# Patient Record
Sex: FEMALE | Race: WHITE | Employment: FULL TIME | URBAN - METROPOLITAN AREA
[De-identification: names, ages, dates, MRNs, and addresses within clinical notes are randomized per-mention and may not be internally consistent; named-entity substitution may affect disease eponyms.]

---

## 2017-01-10 ENCOUNTER — TELEPHONE (OUTPATIENT)
Dept: INTERNAL MEDICINE CLINIC | Age: 42
End: 2017-01-10

## 2017-02-13 ENCOUNTER — HOSPITAL ENCOUNTER (OUTPATIENT)
Dept: OTHER | Age: 42
Discharge: HOME | End: 2017-02-13
Attending: INTERNAL MEDICINE | Admitting: INTERNAL MEDICINE

## 2017-02-13 ENCOUNTER — OFFICE VISIT (OUTPATIENT)
Dept: INTERNAL MEDICINE CLINIC | Age: 42
End: 2017-02-13

## 2017-02-13 VITALS
SYSTOLIC BLOOD PRESSURE: 92 MMHG | DIASTOLIC BLOOD PRESSURE: 62 MMHG | BODY MASS INDEX: 23.93 KG/M2 | HEART RATE: 92 BPM | WEIGHT: 140.2 LBS | HEIGHT: 64 IN | TEMPERATURE: 98.9 F

## 2017-02-13 DIAGNOSIS — R10.13 EPIGASTRIC PAIN: Primary | ICD-10-CM

## 2017-02-13 DIAGNOSIS — N93.9 VAGINAL SPOTTING: ICD-10-CM

## 2017-02-13 LAB
A/G RATIO: 1.4 (ref 1.1–2.2)
ALBUMIN SERPL-MCNC: 4.6 G/DL (ref 3.4–5)
ALP BLD-CCNC: 45 U/L (ref 40–129)
ALT SERPL-CCNC: 18 U/L (ref 10–40)
ANION GAP SERPL CALCULATED.3IONS-SCNC: 14 MMOL/L (ref 3–16)
AST SERPL-CCNC: 18 U/L (ref 15–37)
BILIRUB SERPL-MCNC: 0.3 MG/DL (ref 0–1)
BILIRUBIN, POC: NORMAL
BLOOD URINE, POC: NORMAL
BUN BLDV-MCNC: 8 MG/DL (ref 7–20)
CALCIUM SERPL-MCNC: 10.5 MG/DL (ref 8.3–10.6)
CHLORIDE BLD-SCNC: 101 MMOL/L (ref 99–110)
CLARITY, POC: NORMAL
CO2: 25 MMOL/L (ref 21–32)
COLOR, POC: NORMAL
CONTROL: NORMAL
CREAT SERPL-MCNC: 0.6 MG/DL (ref 0.6–1.1)
GFR AFRICAN AMERICAN: >60
GFR NON-AFRICAN AMERICAN: >60
GLOBULIN: 3.3 G/DL
GLUCOSE BLD-MCNC: 95 MG/DL (ref 70–99)
GLUCOSE URINE, POC: NORMAL
HCT VFR BLD CALC: 37 % (ref 36–48)
HEMOGLOBIN: 12.6 G/DL (ref 12–16)
KETONES, POC: NORMAL
LEUKOCYTE EST, POC: NORMAL
LIPASE: 49 U/L (ref 13–60)
MCH RBC QN AUTO: 30.2 PG (ref 26–34)
MCHC RBC AUTO-ENTMCNC: 34 G/DL (ref 31–36)
MCV RBC AUTO: 88.7 FL (ref 80–100)
NITRITE, POC: NORMAL
PDW BLD-RTO: 13.4 % (ref 12.4–15.4)
PH, POC: 5.5
PLATELET # BLD: 282 K/UL (ref 135–450)
PMV BLD AUTO: 8 FL (ref 5–10.5)
POTASSIUM SERPL-SCNC: 4.3 MMOL/L (ref 3.5–5.1)
PREGNANCY TEST URINE, POC: NEGATIVE
PROTEIN, POC: NORMAL
RBC # BLD: 4.17 M/UL (ref 4–5.2)
SODIUM BLD-SCNC: 140 MMOL/L (ref 136–145)
SPECIFIC GRAVITY, POC: 1.01
TOTAL PROTEIN: 7.9 G/DL (ref 6.4–8.2)
UROBILINOGEN, POC: 0.2
WBC # BLD: 9.4 K/UL (ref 4–11)

## 2017-02-13 PROCEDURE — 81025 URINE PREGNANCY TEST: CPT | Performed by: INTERNAL MEDICINE

## 2017-02-13 PROCEDURE — 81002 URINALYSIS NONAUTO W/O SCOPE: CPT | Performed by: INTERNAL MEDICINE

## 2017-02-13 PROCEDURE — 99214 OFFICE O/P EST MOD 30 MIN: CPT | Performed by: INTERNAL MEDICINE

## 2017-02-13 RX ORDER — OMEPRAZOLE 20 MG/1
20 CAPSULE, DELAYED RELEASE ORAL DAILY
Qty: 14 CAPSULE | Refills: 0 | Status: SHIPPED | OUTPATIENT
Start: 2017-02-13 | End: 2017-03-07

## 2017-02-28 RX ORDER — DICYCLOMINE HYDROCHLORIDE 10 MG/1
10 CAPSULE ORAL 3 TIMES DAILY PRN
Qty: 20 CAPSULE | Refills: 0 | Status: SHIPPED | OUTPATIENT
Start: 2017-02-28 | End: 2017-03-07

## 2017-03-01 ENCOUNTER — TELEPHONE (OUTPATIENT)
Dept: INTERNAL MEDICINE CLINIC | Age: 42
End: 2017-03-01

## 2017-03-06 ENCOUNTER — TELEPHONE (OUTPATIENT)
Dept: INTERNAL MEDICINE CLINIC | Age: 42
End: 2017-03-06

## 2017-03-07 ENCOUNTER — OFFICE VISIT (OUTPATIENT)
Dept: INTERNAL MEDICINE CLINIC | Age: 42
End: 2017-03-07

## 2017-03-07 VITALS
BODY MASS INDEX: 24.48 KG/M2 | DIASTOLIC BLOOD PRESSURE: 70 MMHG | HEIGHT: 64 IN | HEART RATE: 88 BPM | SYSTOLIC BLOOD PRESSURE: 108 MMHG | WEIGHT: 143.4 LBS

## 2017-03-07 DIAGNOSIS — R19.7 DIARRHEA, UNSPECIFIED TYPE: Primary | ICD-10-CM

## 2017-03-07 DIAGNOSIS — K58.2 IRRITABLE BOWEL SYNDROME WITH BOTH CONSTIPATION AND DIARRHEA: ICD-10-CM

## 2017-03-07 PROCEDURE — 99213 OFFICE O/P EST LOW 20 MIN: CPT | Performed by: INTERNAL MEDICINE

## 2017-03-07 RX ORDER — HYOSCYAMINE SULFATE 0.125 MG
125 TABLET ORAL EVERY 4 HOURS PRN
Qty: 90 TABLET | Refills: 1 | Status: SHIPPED | OUTPATIENT
Start: 2017-03-07 | End: 2018-03-13 | Stop reason: ALTCHOICE

## 2017-03-30 LAB
HPV COMMENT: NORMAL
HPV TYPE 16: NOT DETECTED
HPV TYPE 18: NOT DETECTED
HPVOH (OTHER TYPES): NOT DETECTED

## 2017-05-13 ENCOUNTER — EMPLOYEE WELLNESS (OUTPATIENT)
Dept: OTHER | Age: 42
End: 2017-05-13

## 2017-05-13 LAB
CHOLESTEROL, TOTAL: 150 MG/DL (ref 0–199)
GLUCOSE BLD-MCNC: 83 MG/DL (ref 70–99)
HDLC SERPL-MCNC: 48 MG/DL (ref 40–60)
LDL CHOLESTEROL CALCULATED: 87 MG/DL
TRIGL SERPL-MCNC: 74 MG/DL (ref 0–150)

## 2017-06-01 ENCOUNTER — OFFICE VISIT (OUTPATIENT)
Dept: INTERNAL MEDICINE CLINIC | Age: 42
End: 2017-06-01

## 2017-06-01 ENCOUNTER — OFFICE VISIT (OUTPATIENT)
Dept: PSYCHOLOGY | Age: 42
End: 2017-06-01

## 2017-06-01 VITALS
HEART RATE: 100 BPM | DIASTOLIC BLOOD PRESSURE: 80 MMHG | OXYGEN SATURATION: 97 % | SYSTOLIC BLOOD PRESSURE: 118 MMHG | WEIGHT: 142.8 LBS | BODY MASS INDEX: 23.79 KG/M2 | HEIGHT: 65 IN

## 2017-06-01 DIAGNOSIS — F41.9 ANXIETY: ICD-10-CM

## 2017-06-01 DIAGNOSIS — F41.1 GAD (GENERALIZED ANXIETY DISORDER): Primary | ICD-10-CM

## 2017-06-01 DIAGNOSIS — F41.0 PANIC ATTACKS: ICD-10-CM

## 2017-06-01 DIAGNOSIS — F44.9 CONVERSION DISORDER: Primary | ICD-10-CM

## 2017-06-01 PROCEDURE — 99214 OFFICE O/P EST MOD 30 MIN: CPT | Performed by: INTERNAL MEDICINE

## 2017-06-01 PROCEDURE — 90791 PSYCH DIAGNOSTIC EVALUATION: CPT | Performed by: PSYCHOLOGIST

## 2017-06-01 RX ORDER — LORAZEPAM 0.5 MG/1
0.5 TABLET ORAL EVERY 8 HOURS PRN
Qty: 20 TABLET | Refills: 0 | Status: SHIPPED | OUTPATIENT
Start: 2017-06-01 | End: 2017-07-01

## 2017-06-01 RX ORDER — VENLAFAXINE HYDROCHLORIDE 37.5 MG/1
37.5 CAPSULE, EXTENDED RELEASE ORAL DAILY
Qty: 30 CAPSULE | Refills: 1 | Status: SHIPPED | OUTPATIENT
Start: 2017-06-01 | End: 2017-06-26 | Stop reason: SDUPTHER

## 2017-06-05 ENCOUNTER — TELEPHONE (OUTPATIENT)
Dept: INTERNAL MEDICINE CLINIC | Age: 42
End: 2017-06-05

## 2017-06-06 ENCOUNTER — TELEPHONE (OUTPATIENT)
Dept: INTERNAL MEDICINE CLINIC | Age: 42
End: 2017-06-06

## 2017-06-07 ENCOUNTER — TELEPHONE (OUTPATIENT)
Dept: INTERNAL MEDICINE CLINIC | Age: 42
End: 2017-06-07

## 2017-06-14 ENCOUNTER — TELEPHONE (OUTPATIENT)
Dept: INTERNAL MEDICINE CLINIC | Age: 42
End: 2017-06-14

## 2017-06-26 ENCOUNTER — OFFICE VISIT (OUTPATIENT)
Dept: PSYCHOLOGY | Age: 42
End: 2017-06-26

## 2017-06-26 ENCOUNTER — OFFICE VISIT (OUTPATIENT)
Dept: INTERNAL MEDICINE CLINIC | Age: 42
End: 2017-06-26

## 2017-06-26 VITALS
SYSTOLIC BLOOD PRESSURE: 110 MMHG | HEIGHT: 65 IN | DIASTOLIC BLOOD PRESSURE: 76 MMHG | BODY MASS INDEX: 23.32 KG/M2 | WEIGHT: 140 LBS | HEART RATE: 84 BPM

## 2017-06-26 DIAGNOSIS — R09.82 POST-NASAL DRIP: ICD-10-CM

## 2017-06-26 DIAGNOSIS — F41.1 GAD (GENERALIZED ANXIETY DISORDER): Primary | ICD-10-CM

## 2017-06-26 DIAGNOSIS — F41.9 ANXIETY: Primary | ICD-10-CM

## 2017-06-26 PROCEDURE — 99213 OFFICE O/P EST LOW 20 MIN: CPT | Performed by: INTERNAL MEDICINE

## 2017-06-26 PROCEDURE — 90832 PSYTX W PT 30 MINUTES: CPT | Performed by: PSYCHOLOGIST

## 2017-06-26 RX ORDER — VENLAFAXINE HYDROCHLORIDE 75 MG/1
75 CAPSULE, EXTENDED RELEASE ORAL DAILY
Qty: 30 CAPSULE | Refills: 1 | Status: SHIPPED | OUTPATIENT
Start: 2017-06-26 | End: 2017-10-04 | Stop reason: SDUPTHER

## 2017-07-20 ENCOUNTER — OFFICE VISIT (OUTPATIENT)
Dept: INTERNAL MEDICINE CLINIC | Age: 42
End: 2017-07-20

## 2017-07-20 VITALS
WEIGHT: 141.4 LBS | DIASTOLIC BLOOD PRESSURE: 84 MMHG | HEART RATE: 88 BPM | HEIGHT: 65 IN | SYSTOLIC BLOOD PRESSURE: 126 MMHG | BODY MASS INDEX: 23.56 KG/M2

## 2017-07-20 DIAGNOSIS — F41.9 ANXIETY: Primary | ICD-10-CM

## 2017-07-20 DIAGNOSIS — F51.01 PRIMARY INSOMNIA: ICD-10-CM

## 2017-07-20 PROCEDURE — 99213 OFFICE O/P EST LOW 20 MIN: CPT | Performed by: INTERNAL MEDICINE

## 2017-08-22 ENCOUNTER — TELEPHONE (OUTPATIENT)
Dept: INTERNAL MEDICINE CLINIC | Age: 42
End: 2017-08-22

## 2017-08-22 RX ORDER — LORAZEPAM 0.5 MG/1
0.5 TABLET ORAL EVERY 8 HOURS PRN
Qty: 12 TABLET | Refills: 0 | Status: SHIPPED | OUTPATIENT
Start: 2017-08-22 | End: 2017-09-21

## 2017-10-04 RX ORDER — VENLAFAXINE HYDROCHLORIDE 75 MG/1
CAPSULE, EXTENDED RELEASE ORAL
Qty: 30 CAPSULE | Refills: 0 | Status: SHIPPED | OUTPATIENT
Start: 2017-10-04 | End: 2017-10-31 | Stop reason: SDUPTHER

## 2017-10-31 ENCOUNTER — HOSPITAL ENCOUNTER (OUTPATIENT)
Dept: MAMMOGRAPHY | Age: 42
Discharge: HOME | End: 2017-10-31
Attending: INTERNAL MEDICINE | Admitting: INTERNAL MEDICINE

## 2017-10-31 DIAGNOSIS — Z12.31 ENCOUNTER FOR SCREENING MAMMOGRAM FOR MALIGNANT NEOPLASM OF BREAST: ICD-10-CM

## 2017-10-31 RX ORDER — VENLAFAXINE HYDROCHLORIDE 75 MG/1
75 CAPSULE, EXTENDED RELEASE ORAL DAILY
Qty: 90 CAPSULE | Refills: 1 | Status: SHIPPED | OUTPATIENT
Start: 2017-10-31 | End: 2018-03-13 | Stop reason: SDUPTHER

## 2018-03-13 ENCOUNTER — OFFICE VISIT (OUTPATIENT)
Dept: INTERNAL MEDICINE CLINIC | Age: 43
End: 2018-03-13

## 2018-03-13 VITALS
WEIGHT: 155 LBS | SYSTOLIC BLOOD PRESSURE: 108 MMHG | BODY MASS INDEX: 25.83 KG/M2 | HEIGHT: 65 IN | TEMPERATURE: 99.2 F | DIASTOLIC BLOOD PRESSURE: 78 MMHG | HEART RATE: 92 BPM

## 2018-03-13 DIAGNOSIS — J00 ACUTE NASOPHARYNGITIS: Primary | ICD-10-CM

## 2018-03-13 DIAGNOSIS — F32.9 REACTIVE DEPRESSION: ICD-10-CM

## 2018-03-13 DIAGNOSIS — F41.1 GAD (GENERALIZED ANXIETY DISORDER): ICD-10-CM

## 2018-03-13 LAB
INFLUENZA A ANTIBODY: NORMAL
INFLUENZA B ANTIBODY: NORMAL

## 2018-03-13 PROCEDURE — 87804 INFLUENZA ASSAY W/OPTIC: CPT | Performed by: NURSE PRACTITIONER

## 2018-03-13 PROCEDURE — 99213 OFFICE O/P EST LOW 20 MIN: CPT | Performed by: NURSE PRACTITIONER

## 2018-03-13 RX ORDER — BENZONATATE 100 MG/1
100 CAPSULE ORAL 3 TIMES DAILY PRN
Qty: 20 CAPSULE | Refills: 0 | Status: SHIPPED | OUTPATIENT
Start: 2018-03-13 | End: 2018-08-07 | Stop reason: ALTCHOICE

## 2018-03-13 RX ORDER — VENLAFAXINE HYDROCHLORIDE 75 MG/1
75 CAPSULE, EXTENDED RELEASE ORAL DAILY
Qty: 90 CAPSULE | Refills: 1 | Status: SHIPPED | OUTPATIENT
Start: 2018-03-13 | End: 2018-08-07 | Stop reason: SDUPTHER

## 2018-03-13 ASSESSMENT — ENCOUNTER SYMPTOMS
SORE THROAT: 1
COUGH: 1
SINUS PAIN: 0
CHEST TIGHTNESS: 1
WHEEZING: 0

## 2018-03-13 NOTE — LETTER
[facility]  [address] [address]  Phone: [phone]  Fax: [phone]    Nicolás Garcia        March 13, 2018     Patient: Norris Curtis   YOB: 1975   Date of Visit: 3/13/2018       To Whom It May Concern: It is my medical opinion that Norris Curtis may return to work on 3/14/18. If you have any questions or concerns, please don't hesitate to call.     Sincerely,        Victor Manuel Gallo, CNP

## 2018-03-13 NOTE — PROGRESS NOTES
SUBJECTIVE:    Patient ID: Dimas Forte is a 39 y.o. female. CC: Cough    HPI: The patient presents to the office for an acute visit.    2 day history of sore throat, cough, worsening. No fevers. No wheezing. 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 Tried OTC but up all night coughing. Current Outpatient Prescriptions   Medication Sig Dispense Refill    venlafaxine (EFFEXOR XR) 75 MG extended release capsule Take 1 capsule by mouth daily 90 capsule 1    aspirin-acetaminophen-caffeine (EXCEDRIN MIGRAINE) 738-111-54 MG per tablet Take 1 tablet by mouth every 6 hours as needed for Headaches      acetaminophen (TYLENOL) 325 MG tablet Take 325 mg by mouth every 6 hours as needed. No current facility-administered medications for this visit. Review of Systems   Constitutional: Negative for fever. HENT: Positive for sore throat. Negative for congestion, ear pain and sinus pain. Respiratory: Positive for cough and chest tightness. Negative for wheezing. Skin: Negative for rash. OBJECTIVE:  Physical Exam   Constitutional: She appears well-developed and well-nourished. She is cooperative. Non-toxic appearance. She appears ill. No distress. HENT:   Head: Normocephalic and atraumatic. Right Ear: Tympanic membrane normal.   Left Ear: Tympanic membrane normal.   Nose: Right sinus exhibits maxillary sinus tenderness. Right sinus exhibits no frontal sinus tenderness. Left sinus exhibits no maxillary sinus tenderness and no frontal sinus tenderness. Mouth/Throat: Posterior oropharyngeal erythema present. No oropharyngeal exudate or posterior oropharyngeal edema. Pulmonary/Chest: Effort normal and breath sounds normal. No respiratory distress. Neurological: She is alert. Skin: She is not diaphoretic.       /78   Pulse 92   Temp 99.2 °F (37.3 °C) (Oral)   Ht 5' 5\" (1.651 m)   Wt 155 lb (70.3 kg)   BMI 25.79 kg/m²          ASSESSMENT/PLAN:  Scott Monterroso was seen today for cough.  Diagnoses and all orders for this visit:    Acute nasopharyngitis  - 2 days URI symptoms  - No clear evidence of bacterial infection, suspect acute viral illness  - Rest, fluids, Tylenol cold and cough, Tessalon  -     benzonatate (TESSALON PERLES) 100 MG capsule; Take 1 capsule by mouth 3 times daily as needed for Cough        Reactive depression  MIKHAIL (generalized anxiety disorder)  - Requesting a refill of her anxiety/depression medication. She reports this is stable. -     venlafaxine (EFFEXOR XR) 75 MG extended release capsule; Take 1 capsule by mouth daily    Follow-up PCP 6 months or as needed if worsening or failing to improve.       Eliezer Tom, CNP

## 2018-03-15 ENCOUNTER — TELEPHONE (OUTPATIENT)
Dept: INTERNAL MEDICINE CLINIC | Age: 43
End: 2018-03-15

## 2018-03-15 NOTE — LETTER
[facility] [place] [address]  Phone: [phone]  Fax: [phone]    Dena Saleem CNP        3/15/2018      Patient: Buddy Higginbotham   YOB: 1975   Date of Visit: 3/15/2018       To Whom it May Concern:    Buddy Higginbotham was seen in my clinic on 3/13/2018. She may return to work 3/15/18. If you have any questions or concerns, please don't hesitate to call.     Sincerely,         Malinda Postal

## 2018-03-20 VITALS — WEIGHT: 140 LBS | BODY MASS INDEX: 24.03 KG/M2

## 2018-07-30 ENCOUNTER — HOSPITAL ENCOUNTER (OUTPATIENT)
Dept: OTHER | Age: 43
Discharge: HOME | End: 2018-07-30
Attending: INTERNAL MEDICINE | Admitting: INTERNAL MEDICINE

## 2018-07-30 DIAGNOSIS — R00.2 PALPITATIONS: ICD-10-CM

## 2018-07-30 DIAGNOSIS — R00.2 PALPITATIONS: Primary | ICD-10-CM

## 2018-07-30 LAB
A/G RATIO: 1.2 (ref 1.1–2.2)
ALBUMIN SERPL-MCNC: 4.4 G/DL (ref 3.4–5)
ALP BLD-CCNC: 59 U/L (ref 40–129)
ALT SERPL-CCNC: 11 U/L (ref 10–40)
ANION GAP SERPL CALCULATED.3IONS-SCNC: 12 MMOL/L (ref 3–16)
AST SERPL-CCNC: 14 U/L (ref 15–37)
BILIRUB SERPL-MCNC: <0.2 MG/DL (ref 0–1)
BUN BLDV-MCNC: 10 MG/DL (ref 7–20)
CALCIUM SERPL-MCNC: 9.7 MG/DL (ref 8.3–10.6)
CHLORIDE BLD-SCNC: 100 MMOL/L (ref 99–110)
CO2: 26 MMOL/L (ref 21–32)
CREAT SERPL-MCNC: 0.7 MG/DL (ref 0.6–1.1)
GFR AFRICAN AMERICAN: >60
GFR NON-AFRICAN AMERICAN: >60
GLOBULIN: 3.6 G/DL
GLUCOSE BLD-MCNC: 89 MG/DL (ref 70–99)
HCT VFR BLD CALC: 36.6 % (ref 36–48)
HEMOGLOBIN: 12.1 G/DL (ref 12–16)
MCH RBC QN AUTO: 28.3 PG (ref 26–34)
MCHC RBC AUTO-ENTMCNC: 33.2 G/DL (ref 31–36)
MCV RBC AUTO: 85.3 FL (ref 80–100)
PDW BLD-RTO: 14.7 % (ref 12.4–15.4)
PLATELET # BLD: 327 K/UL (ref 135–450)
PMV BLD AUTO: 6.5 FL (ref 5–10.5)
POTASSIUM SERPL-SCNC: 4.6 MMOL/L (ref 3.5–5.1)
RBC # BLD: 4.29 M/UL (ref 4–5.2)
SODIUM BLD-SCNC: 138 MMOL/L (ref 136–145)
TOTAL PROTEIN: 8 G/DL (ref 6.4–8.2)
TSH REFLEX: 1.54 UIU/ML (ref 0.27–4.2)
WBC # BLD: 5.9 K/UL (ref 4–11)

## 2018-08-07 ENCOUNTER — TELEPHONE (OUTPATIENT)
Dept: INTERNAL MEDICINE CLINIC | Age: 43
End: 2018-08-07

## 2018-08-07 ENCOUNTER — OFFICE VISIT (OUTPATIENT)
Dept: INTERNAL MEDICINE CLINIC | Age: 43
End: 2018-08-07

## 2018-08-07 VITALS
TEMPERATURE: 98 F | WEIGHT: 159 LBS | BODY MASS INDEX: 26.46 KG/M2 | HEART RATE: 76 BPM | OXYGEN SATURATION: 98 % | RESPIRATION RATE: 12 BRPM

## 2018-08-07 DIAGNOSIS — F32.9 REACTIVE DEPRESSION: ICD-10-CM

## 2018-08-07 DIAGNOSIS — K04.7 DENTAL INFECTION: ICD-10-CM

## 2018-08-07 DIAGNOSIS — J30.2 SEASONAL ALLERGIES: ICD-10-CM

## 2018-08-07 DIAGNOSIS — F41.1 GAD (GENERALIZED ANXIETY DISORDER): ICD-10-CM

## 2018-08-07 DIAGNOSIS — F43.0 PANIC ATTACK AS REACTION TO STRESS: ICD-10-CM

## 2018-08-07 DIAGNOSIS — R00.2 PALPITATIONS: ICD-10-CM

## 2018-08-07 DIAGNOSIS — R07.89 ATYPICAL CHEST PAIN: ICD-10-CM

## 2018-08-07 DIAGNOSIS — F41.0 PANIC ATTACK AS REACTION TO STRESS: ICD-10-CM

## 2018-08-07 DIAGNOSIS — R00.0 RAPID HEART RATE: Primary | ICD-10-CM

## 2018-08-07 DIAGNOSIS — G43.709 CHRONIC MIGRAINE WITHOUT AURA WITHOUT STATUS MIGRAINOSUS, NOT INTRACTABLE: ICD-10-CM

## 2018-08-07 PROCEDURE — 93000 ELECTROCARDIOGRAM COMPLETE: CPT | Performed by: INTERNAL MEDICINE

## 2018-08-07 PROCEDURE — 99214 OFFICE O/P EST MOD 30 MIN: CPT | Performed by: INTERNAL MEDICINE

## 2018-08-07 RX ORDER — AMOXICILLIN 500 MG/1
500 CAPSULE ORAL 3 TIMES DAILY
COMMUNITY
End: 2018-11-29

## 2018-08-07 RX ORDER — VENLAFAXINE HYDROCHLORIDE 150 MG/1
150 CAPSULE, EXTENDED RELEASE ORAL DAILY
Qty: 30 CAPSULE | Refills: 1 | Status: SHIPPED | OUTPATIENT
Start: 2018-08-07 | End: 2018-10-23 | Stop reason: SDUPTHER

## 2018-08-07 RX ORDER — CLONAZEPAM 0.5 MG/1
0.5 TABLET ORAL 2 TIMES DAILY PRN
Qty: 10 TABLET | Refills: 0 | Status: SHIPPED | OUTPATIENT
Start: 2018-08-07 | End: 2018-11-29

## 2018-08-07 NOTE — PROGRESS NOTES
Has had increased amounts of migraines with tooth abscess. Has also been having some yellow drainage and cough with some yellow sputum. No SOB. Does have a family h/o CAD, father had a stroke in his mid-39's and  at age 48. Started with a DVT. Allergies   Allergen Reactions    Latex Rash    Bentyl [Dicyclomine Hcl]      Lip swelling/throat irritation    Dilaudid [Hydromorphone Hcl] Itching    Doxycycline Swelling    Escitalopram Oxalate Hives      Past Medical History:   Diagnosis Date    Anxiety     Depression     GERD (gastroesophageal reflux disease)     IBS (irritable bowel syndrome)     Ulcer disease       Past Surgical History:   Procedure Laterality Date    ADENOIDECTOMY      CHOLECYSTECTOMY, LAPAROSCOPIC  12    LAPAROSCOPY      FALLOPIAN TUBES    UPPER GASTROINTESTINAL ENDOSCOPY  11    UPPER GASTROINTESTINAL ENDOSCOPY  3-23-11      Social History     Social History    Marital status:      Spouse name: N/A    Number of children: N/A    Years of education: N/A     Occupational History    Not on file. Social History Main Topics    Smoking status: Never Smoker    Smokeless tobacco: Never Used    Alcohol use Yes      Comment: SOCIAL    Drug use: No    Sexual activity: Yes     Partners: Male      Comment:      Other Topics Concern    Not on file     Social History Narrative    No narrative on file     Family History   Problem Relation Age of Onset    Stroke Father     Asthma Mother     Depression Mother         Outpatient Medications Prior to Visit   Medication Sig Dispense Refill    aspirin-acetaminophen-caffeine (EXCEDRIN MIGRAINE) 182-023-86 MG per tablet Take 1 tablet by mouth every 6 hours as needed for Headaches      acetaminophen (TYLENOL) 325 MG tablet Take 325 mg by mouth every 6 hours as needed.       benzonatate (TESSALON PERLES) 100 MG capsule Take 1 capsule by mouth 3 times daily as needed for Cough 20 capsule 0    Normal range of motion. Neck supple. Carotid bruit is not present. No thyroid mass and no thyromegaly present. Cardiovascular: Normal rate, regular rhythm, normal heart sounds and intact distal pulses. No murmur heard. Pulses:       Dorsalis pedis pulses are 2+ on the right side, and 2+ on the left side. No LE edema   Pulmonary/Chest: Effort normal and breath sounds normal. No respiratory distress. She has no wheezes. She has no rales. She exhibits no tenderness. Abdominal: Soft. Bowel sounds are normal. She exhibits no mass. There is no tenderness. Lymphadenopathy:     She has no cervical adenopathy. Neurological: She is alert and oriented to person, place, and time. Skin: Skin is warm and dry. No rash noted. No pallor. Psychiatric: Her behavior is normal. Judgment and thought content normal. Her mood appears anxious. ASSESSMENT/PLAN:    Problem List Items Addressed This Visit     Migraine     Worse currently related to tooth abscess. Continue with Advil or Aleve prn. Relevant Medications    venlafaxine (EFFEXOR XR) 150 MG extended release capsule    clonazePAM (KLONOPIN) 0.5 MG tablet    Palpitations     Suspect related to stress and anxiety. Labs from Dr. Radonna Schwab normal (TSH, CMP, and CBC). EKG unremarkable. Reviewed checking pulse, normal pulse, and to go to ER if tachycardic and symptomatic with lightheadedness or chest pain or SOB. No need for heart monitor at this time. Reactive depression     Worse currently with increased stress. Has been \"stress eating\" as a way to deal with her depression. Increase Effexor XR to 150 mg po qd. Relevant Medications    venlafaxine (EFFEXOR XR) 150 MG extended release capsule    MIKHAIL (generalized anxiety disorder)     Worsening with increased stress. Increase Effexor XR to 150 mg qd. Will give a small amount of Klonopin to help with panic attacks.  Advised this was a short term prescription to be used only in cases of severe

## 2018-08-12 ASSESSMENT — ENCOUNTER SYMPTOMS
DIARRHEA: 0
CONSTIPATION: 0
SINUS PRESSURE: 0
SINUS PAIN: 0
CHEST TIGHTNESS: 0
SORE THROAT: 0
SHORTNESS OF BREATH: 1

## 2018-08-13 NOTE — ASSESSMENT & PLAN NOTE
Suspect related to stress and anxiety. Labs from Dr. Sunitha Matthews normal (TSH, CMP, and CBC). EKG unremarkable. Reviewed checking pulse, normal pulse, and to go to ER if tachycardic and symptomatic with lightheadedness or chest pain or SOB. No need for heart monitor at this time.

## 2018-08-13 NOTE — ASSESSMENT & PLAN NOTE
Worsening with increased stress. Increase Effexor XR to 150 mg qd. Will give a small amount of Klonopin to help with panic attacks. Advised this was a short term prescription to be used only in cases of severe anxiety and panic and that Klonopin does have addiction potential and is not for long term use. Consider referral for therapy if she does not improve.

## 2018-08-13 NOTE — ASSESSMENT & PLAN NOTE
Increase Effexor XR to 150 mg qd, consider therapy. Small amount of Klonopin 0.5 mg given prn for panic and severe anxiety. Discussed that Klonopin is a short term prescription and to be used only when unable to self-relax. Advised that Klonopin carries a risk of addiction and possible over sedation.

## 2018-09-10 DIAGNOSIS — R00.2 HEART PALPITATIONS: Primary | ICD-10-CM

## 2018-09-19 ENCOUNTER — TELEPHONE (OUTPATIENT)
Dept: CARDIOLOGY CLINIC | Age: 43
End: 2018-09-19

## 2018-10-09 ENCOUNTER — OFFICE VISIT (OUTPATIENT)
Dept: INTERNAL MEDICINE CLINIC | Age: 43
End: 2018-10-09
Payer: COMMERCIAL

## 2018-10-09 VITALS
TEMPERATURE: 99.5 F | SYSTOLIC BLOOD PRESSURE: 134 MMHG | WEIGHT: 158.8 LBS | BODY MASS INDEX: 26.46 KG/M2 | HEART RATE: 78 BPM | DIASTOLIC BLOOD PRESSURE: 84 MMHG | HEIGHT: 65 IN

## 2018-10-09 DIAGNOSIS — J02.9 SORE THROAT: Primary | ICD-10-CM

## 2018-10-09 PROCEDURE — 87880 STREP A ASSAY W/OPTIC: CPT | Performed by: INTERNAL MEDICINE

## 2018-10-09 PROCEDURE — 99213 OFFICE O/P EST LOW 20 MIN: CPT | Performed by: INTERNAL MEDICINE

## 2018-10-09 NOTE — LETTER
[facility]  [address] [place] [address]  Phone: [phone]  Fax: [phone]    Blu Mukherjee MD        October 9, 2018     Patient: Vidhi Chung   YOB: 1975   Date of Visit: 10/9/2018       To Whom It May Concern:     Vidhi Chung was seen in my clinic today. If you have any questions or concerns, please don't hesitate to call.     Sincerely,        Blu Mukherjee MD

## 2018-10-23 ENCOUNTER — TELEPHONE (OUTPATIENT)
Dept: INTERNAL MEDICINE CLINIC | Age: 43
End: 2018-10-23

## 2018-10-23 DIAGNOSIS — F32.9 REACTIVE DEPRESSION: ICD-10-CM

## 2018-10-23 DIAGNOSIS — F41.1 GAD (GENERALIZED ANXIETY DISORDER): ICD-10-CM

## 2018-10-23 RX ORDER — VENLAFAXINE HYDROCHLORIDE 150 MG/1
150 CAPSULE, EXTENDED RELEASE ORAL DAILY
Qty: 90 CAPSULE | Refills: 1 | Status: SHIPPED | OUTPATIENT
Start: 2018-10-23 | End: 2019-06-12 | Stop reason: SDUPTHER

## 2018-10-23 NOTE — TELEPHONE ENCOUNTER
Pt calling for refill of Effexor ----90 day fill---Please send to [facility]. Thanks.

## 2018-11-27 ENCOUNTER — TELEPHONE (OUTPATIENT)
Dept: INTERNAL MEDICINE CLINIC | Age: 43
End: 2018-11-27

## 2018-11-29 ENCOUNTER — OFFICE VISIT (OUTPATIENT)
Dept: INTERNAL MEDICINE CLINIC | Age: 43
End: 2018-11-29
Payer: COMMERCIAL

## 2018-11-29 VITALS
WEIGHT: 161 LBS | DIASTOLIC BLOOD PRESSURE: 82 MMHG | SYSTOLIC BLOOD PRESSURE: 128 MMHG | BODY MASS INDEX: 26.82 KG/M2 | HEART RATE: 80 BPM | TEMPERATURE: 98.8 F | HEIGHT: 65 IN

## 2018-11-29 DIAGNOSIS — J32.9 RHINOSINUSITIS: Primary | ICD-10-CM

## 2018-11-29 DIAGNOSIS — J31.0 RHINOSINUSITIS: Primary | ICD-10-CM

## 2018-11-29 PROCEDURE — 99213 OFFICE O/P EST LOW 20 MIN: CPT | Performed by: INTERNAL MEDICINE

## 2018-11-29 RX ORDER — AZITHROMYCIN 250 MG/1
250 TABLET, FILM COATED ORAL SEE ADMIN INSTRUCTIONS
Qty: 6 TABLET | Refills: 0 | Status: SHIPPED | OUTPATIENT
Start: 2018-11-29 | End: 2018-12-04

## 2019-05-08 ENCOUNTER — NURSE TRIAGE (OUTPATIENT)
Dept: OTHER | Facility: CLINIC | Age: 44
End: 2019-05-08

## 2019-05-08 NOTE — TELEPHONE ENCOUNTER
Reason for Disposition   Can't stand (bear weight) or walk (e.g., 4 steps)    Answer Assessment - Initial Assessment Questions  1. MECHANISM: \"How did the injury happen? \" (e.g., twisting injury, direct blow)       Pt was walking down the hallway with new shoes on and her ankle \"went down\" resulting in her ankle injury. 2. ONSET: \"When did the injury happen? \" (Minutes or hours ago)       21  today  3. LOCATION: \"Where is the injury located? \"       ankle  4. APPEARANCE of INJURY: \"What does the injury look like? \"       Swelling present , slightly discolored  5. WEIGHT-BEARING: \"Can you put weight on that foot? \" \"Can you walk (four steps or more)? \"        Pt can not bear weight  6. SIZE: For cuts, bruises, or swelling, ask: \"How large is it? \" (e.g., inches or centimeters;  entire joint)       *No Answer*  7. PAIN: \"Is there pain? \" If so, ask: \"How bad is the pain? \"    (e.g., Scale 1-10; or mild, moderate, severe)      Rates \"10\"/10  8. TETANUS: For any breaks in the skin, ask: \"When was the last tetanus booster? \"      No open skin  9. OTHER SYMPTOMS: \"Do you have any other symptoms? \"       It is a little bit numb  10. PREGNANCY: \"Is there any chance you are pregnant? \" \"When was your last menstrual period? \"        *No Answer*    Protocols used: ANKLE AND FOOT INJURY-ADULT-[place]  Patient's location of employment:[place]   Location of injury: in [facility], [address]  Time of injury: 1238  Last 4 of patient's SSN: [id number]  Location recommended for treatment: [facility]

## 2019-07-16 ENCOUNTER — OFFICE VISIT (OUTPATIENT)
Dept: INTERNAL MEDICINE CLINIC | Age: 44
End: 2019-07-16
Payer: COMMERCIAL

## 2019-07-16 VITALS
BODY MASS INDEX: 27.32 KG/M2 | HEIGHT: 65 IN | SYSTOLIC BLOOD PRESSURE: 124 MMHG | DIASTOLIC BLOOD PRESSURE: 82 MMHG | WEIGHT: 164 LBS | HEART RATE: 84 BPM

## 2019-07-16 DIAGNOSIS — G43.109 MIGRAINE WITH AURA AND WITHOUT STATUS MIGRAINOSUS, NOT INTRACTABLE: ICD-10-CM

## 2019-07-16 DIAGNOSIS — G44.209 TENSION HEADACHE: Primary | ICD-10-CM

## 2019-07-16 PROCEDURE — 99213 OFFICE O/P EST LOW 20 MIN: CPT | Performed by: NURSE PRACTITIONER

## 2019-07-16 RX ORDER — SUMATRIPTAN 50 MG/1
50 TABLET, FILM COATED ORAL
Qty: 15 TABLET | Refills: 3 | Status: ON HOLD | OUTPATIENT
Start: 2019-07-16 | End: 2020-06-16

## 2019-07-16 NOTE — PROGRESS NOTES
SUBJECTIVE:    Patient ID: Cliff Pearson is a 38 y.o. female. CC: migraine, headache    HPI: The patient presents to the office for an acute visit. Headaches consistent with her typical migraines for about 2 weeks. She also has a history of tension headaches. Worse is intensity and frequently  Headache last 9/14 days. She has vision changes with headache. She has nausea, no vomiting. Headache is currently bilateral in nature and located around her temples. She has been taking Excedrin migraine and 1500 mg Tylenol. These will take edge off but not resolving. Current Outpatient Medications   Medication Sig Dispense Refill    venlafaxine (EFFEXOR XR) 150 MG extended release capsule TAKE 1 CAPSULE BY MOUTH ONE TIME A DAY 90 capsule 0    aspirin-acetaminophen-caffeine (EXCEDRIN MIGRAINE) 250-250-65 MG per tablet Take 1 tablet by mouth every 6 hours as needed for Headaches      acetaminophen (TYLENOL) 325 MG tablet Take 325 mg by mouth every 6 hours as needed. No current facility-administered medications for this visit. Review of Systems   Constitutional: Negative for fever. Neurological: Positive for headaches. Negative for dizziness and weakness. All other systems reviewed and are negative. OBJECTIVE:  Physical Exam   Constitutional: She is oriented to person, place, and time. She appears well-developed and well-nourished. HENT:   Head: Normocephalic and atraumatic. Pulmonary/Chest: Effort normal. No respiratory distress. Neurological: She is alert and oriented to person, place, and time. Skin: Skin is warm and dry. /82   Pulse 84   Ht 5' 5\" (1.651 m)   Wt 164 lb (74.4 kg)   BMI 27.29 kg/m²      No flowsheet data found.   Interpretation of Total Score Depression Severity: 1-4 = Minimal depression, 5-9 = Mild depression, 10-14 = Moderate depression, 15-19 = Moderately severe depression, 20-27 =Severe depression        ASSESSMENT/PLAN:  Ben Valdes was seen today for migraine. Diagnoses and all orders for this visit:    Tension headache  -     SUMAtriptan (IMITREX) 50 MG tablet; Take 1 tablet by mouth once as needed for Migraine    Migraine with aura and without status migrainosus, not intractable  -     SUMAtriptan (IMITREX) 50 MG tablet; Take 1 tablet by mouth once as needed for Migraine    -She is having symptoms of both tension headaches as well as migraine headaches. She has a history of both. -Headache last 9/14 days.  -She is using NSAIDs with some improvement but no resolution. She previously used Imitrex which helped. We discussed resuming this.  -We also discussed taking a daily medication for headache prophylaxis which she will consider.       Lesly Mejia, APRN - CNP

## 2019-10-01 ENCOUNTER — TELEPHONE (OUTPATIENT)
Dept: INTERNAL MEDICINE CLINIC | Age: 44
End: 2019-10-01

## 2019-10-01 DIAGNOSIS — F32.9 REACTIVE DEPRESSION: ICD-10-CM

## 2019-10-01 DIAGNOSIS — F41.1 GAD (GENERALIZED ANXIETY DISORDER): ICD-10-CM

## 2019-10-01 RX ORDER — VENLAFAXINE HYDROCHLORIDE 150 MG/1
CAPSULE, EXTENDED RELEASE ORAL
Qty: 90 CAPSULE | Refills: 0 | Status: SHIPPED | OUTPATIENT
Start: 2019-10-01 | End: 2020-01-08

## 2019-11-13 ENCOUNTER — TELEPHONE (OUTPATIENT)
Dept: INTERNAL MEDICINE CLINIC | Age: 44
End: 2019-11-13

## 2019-11-13 NOTE — TELEPHONE ENCOUNTER
Pt calling having many side effects from the Effexor---swelling---weight gain---fast heart rate---anxiety---insomnia---she would like to try switching to Paxil ---can you call her about this please ----Thanks.

## 2019-12-19 DIAGNOSIS — F41.0 PANIC ATTACK AS REACTION TO STRESS: ICD-10-CM

## 2019-12-19 DIAGNOSIS — F43.0 PANIC ATTACK AS REACTION TO STRESS: ICD-10-CM

## 2019-12-19 DIAGNOSIS — F41.1 GAD (GENERALIZED ANXIETY DISORDER): ICD-10-CM

## 2019-12-20 RX ORDER — CLONAZEPAM 0.5 MG/1
TABLET ORAL
Qty: 10 TABLET | Refills: 0 | Status: SHIPPED | OUTPATIENT
Start: 2019-12-20 | End: 2021-01-18 | Stop reason: ALTCHOICE

## 2020-01-08 RX ORDER — VENLAFAXINE HYDROCHLORIDE 150 MG/1
CAPSULE, EXTENDED RELEASE ORAL
Qty: 90 CAPSULE | Refills: 0 | Status: SHIPPED | OUTPATIENT
Start: 2020-01-08 | End: 2020-04-09

## 2020-02-07 ENCOUNTER — HOSPITAL ENCOUNTER (OUTPATIENT)
Age: 45
Discharge: HOME | End: 2020-02-07
Payer: COMMERCIAL

## 2020-02-07 LAB
BASOPHILS ABSOLUTE: 0 K/UL (ref 0–0.2)
BASOPHILS RELATIVE PERCENT: 0.8 %
EOSINOPHILS ABSOLUTE: 0 K/UL (ref 0–0.6)
EOSINOPHILS RELATIVE PERCENT: 0.1 %
FERRITIN: 6.2 NG/ML (ref 15–150)
HCT VFR BLD CALC: 30.1 % (ref 36–48)
HEMOGLOBIN: 9.5 G/DL (ref 12–16)
IRON SATURATION: 6 % (ref 15–50)
IRON: 29 UG/DL (ref 37–145)
LYMPHOCYTES ABSOLUTE: 1.5 K/UL (ref 1–5.1)
LYMPHOCYTES RELATIVE PERCENT: 27.5 %
MCH RBC QN AUTO: 24.2 PG (ref 26–34)
MCHC RBC AUTO-ENTMCNC: 31.5 G/DL (ref 31–36)
MCV RBC AUTO: 76.8 FL (ref 80–100)
MONOCYTES ABSOLUTE: 0.3 K/UL (ref 0–1.3)
MONOCYTES RELATIVE PERCENT: 5.8 %
NEUTROPHILS ABSOLUTE: 3.6 K/UL (ref 1.7–7.7)
NEUTROPHILS RELATIVE PERCENT: 65.8 %
PDW BLD-RTO: 16 % (ref 12.4–15.4)
PLATELET # BLD: 360 K/UL (ref 135–450)
PMV BLD AUTO: 6.9 FL (ref 5–10.5)
RBC # BLD: 3.92 M/UL (ref 4–5.2)
TOTAL IRON BINDING CAPACITY: 474 UG/DL (ref 260–445)
WBC # BLD: 5.4 K/UL (ref 4–11)

## 2020-02-07 PROCEDURE — 82728 ASSAY OF FERRITIN: CPT

## 2020-02-07 PROCEDURE — 36415 COLL VENOUS BLD VENIPUNCTURE: CPT

## 2020-02-07 PROCEDURE — 83540 ASSAY OF IRON: CPT

## 2020-02-07 PROCEDURE — 83550 IRON BINDING TEST: CPT

## 2020-02-07 PROCEDURE — 85025 COMPLETE CBC W/AUTO DIFF WBC: CPT

## 2020-02-10 ENCOUNTER — OFFICE VISIT (OUTPATIENT)
Dept: INTERNAL MEDICINE CLINIC | Age: 45
End: 2020-02-10
Payer: COMMERCIAL

## 2020-02-10 VITALS
DIASTOLIC BLOOD PRESSURE: 84 MMHG | BODY MASS INDEX: 27.32 KG/M2 | HEIGHT: 65 IN | HEART RATE: 88 BPM | WEIGHT: 164 LBS | SYSTOLIC BLOOD PRESSURE: 136 MMHG

## 2020-02-10 PROCEDURE — 99213 OFFICE O/P EST LOW 20 MIN: CPT | Performed by: NURSE PRACTITIONER

## 2020-02-10 RX ORDER — HYDROXYZINE HYDROCHLORIDE 25 MG/1
25 TABLET, FILM COATED ORAL EVERY 8 HOURS PRN
Qty: 30 TABLET | Refills: 2 | Status: SHIPPED | OUTPATIENT
Start: 2020-02-10 | End: 2021-01-18 | Stop reason: ALTCHOICE

## 2020-02-10 RX ORDER — LANOLIN ALCOHOL/MO/W.PET/CERES
325 CREAM (GRAM) TOPICAL 2 TIMES DAILY
Qty: 60 TABLET | Refills: 5 | Status: SHIPPED | OUTPATIENT
Start: 2020-02-10 | End: 2020-06-09 | Stop reason: SDUPTHER

## 2020-02-11 ASSESSMENT — ENCOUNTER SYMPTOMS
SHORTNESS OF BREATH: 1
BLOOD IN STOOL: 0

## 2020-02-11 NOTE — PROGRESS NOTES
SUBJECTIVE:    Patient ID: Dena Buenrostro is a 38 y.o. female. CC: Anemia    HPI: The patient presents to the office for an acute visit. Patient had blood work performed recently for shortness of breath which showed microcytic anemia with low ferritin, iron, and elevated total iron-binding consistent with iron deficiency anemia. She is here today for further evaluation and treatment of this. She reports symptoms of anemia including: Fatigue, shortness of breath, craving ice. She denies any rectal bleeding. She denies any hematuria. She admits to heavy menstrual bleeding which happens intermittently but extraordinarily heavy flow last menstrual cycle. She reports regular cycle. She has not seen or contacted her gynecologist about this. She reports family history of colon cancer. She has not yet had colon cancer screening. Current Outpatient Medications   Medication Sig Dispense Refill    ferrous sulfate (FE TABS) 325 (65 Fe) MG EC tablet Take 1 tablet by mouth 2 times daily 60 tablet 5    hydrOXYzine (ATARAX) 25 MG tablet Take 1 tablet by mouth every 8 hours as needed for Anxiety 30 tablet 2    venlafaxine (EFFEXOR XR) 150 MG extended release capsule TAKE 1 CAPSULE BY MOUTH ONE TIME A DAY 90 capsule 0    aspirin-acetaminophen-caffeine (EXCEDRIN MIGRAINE) 250-250-65 MG per tablet Take 1 tablet by mouth every 6 hours as needed for Headaches      SUMAtriptan (IMITREX) 50 MG tablet Take 1 tablet by mouth once as needed for Migraine If no response after 2 hours, repeat. Do not exceed 4 tabs in 24 hours. 9 tablet 0    acetaminophen (TYLENOL) 325 MG tablet Take 325 mg by mouth every 6 hours as needed.  clonazePAM (KLONOPIN) 0.5 MG tablet TAKE ONE TABLET BY MOUTH TWICE A DAY AS NEEDED FOR ANXIETY, FOR UP TO 10 DOSES. MAY ALSO TRY ONE-HALF TABLET.  10 tablet 0    SUMAtriptan (IMITREX) 50 MG tablet Take 1 tablet by mouth once as needed for Migraine 15 tablet 3     No current facility-administered medications for this visit. Review of Systems   Constitutional: Positive for fatigue. Respiratory: Positive for shortness of breath. Gastrointestinal: Negative for blood in stool. Genitourinary: Positive for vaginal bleeding. Negative for hematuria, vaginal discharge and vaginal pain. Hematological:        PICA -ice craving         OBJECTIVE:  Physical Exam  Constitutional:       Appearance: Normal appearance. HENT:      Head: Normocephalic and atraumatic. Skin:     General: Skin is warm and dry. Neurological:      General: No focal deficit present. Mental Status: She is alert and oriented to person, place, and time. Psychiatric:         Mood and Affect: Mood normal.         Behavior: Behavior normal.        /84   Pulse 88   Ht 5' 5\" (1.651 m)   Wt 164 lb (74.4 kg)   BMI 27.29 kg/m²      No flowsheet data found. Interpretation of Total Score Depression Severity: 1-4 = Minimal depression, 5-9 = Mild depression, 10-14 = Moderate depression, 15-19 = Moderately severe depression, 20-27 =Severe depression        ASSESSMENT/PLAN:  Juliane Bowling was seen today for discuss labs. Diagnoses and all orders for this visit:    Iron deficiency anemia due to chronic blood loss  -     ferrous sulfate (FE TABS) 325 (65 Fe) MG EC tablet; Take 1 tablet by mouth 2 times daily  -     [facility] Brigsg [facility]  -     CBC WITH AUTO DIFFERENTIAL; Future    Menorrhagia with regular cycle  -     ferrous sulfate (FE TABS) 325 (65 Fe) MG EC tablet; Take 1 tablet by mouth 2 times daily  -     Fely Briggs MD, [facility]  -     CBC WITH AUTO DIFFERENTIAL; Future    Family history of colon cancer  -     COLONOSCOPY (Screening)    Colon cancer screening  -     COLONOSCOPY (Screening)    Other orders  -     hydrOXYzine (ATARAX) 25 MG tablet;  Take 1 tablet by mouth every 8 hours as needed for Anxiety        RICHARD Joaquin - CNP

## 2020-02-17 ENCOUNTER — HOSPITAL ENCOUNTER (EMERGENCY)
Age: 45
Discharge: HOME | End: 2020-02-17
Attending: EMERGENCY MEDICINE
Payer: COMMERCIAL

## 2020-02-17 ENCOUNTER — APPOINTMENT (OUTPATIENT)
Dept: GENERAL RADIOLOGY | Age: 45
End: 2020-02-17
Payer: COMMERCIAL

## 2020-02-17 VITALS
HEART RATE: 96 BPM | WEIGHT: 158 LBS | DIASTOLIC BLOOD PRESSURE: 90 MMHG | OXYGEN SATURATION: 97 % | SYSTOLIC BLOOD PRESSURE: 152 MMHG | HEIGHT: 64 IN | BODY MASS INDEX: 26.98 KG/M2 | TEMPERATURE: 98 F | RESPIRATION RATE: 18 BRPM

## 2020-02-17 LAB
ANION GAP SERPL CALCULATED.3IONS-SCNC: 14 MMOL/L (ref 3–16)
BASOPHILS ABSOLUTE: 0.1 K/UL (ref 0–0.2)
BASOPHILS RELATIVE PERCENT: 0.9 %
BUN BLDV-MCNC: 9 MG/DL (ref 7–20)
CALCIUM SERPL-MCNC: 9.2 MG/DL (ref 8.3–10.6)
CHLORIDE BLD-SCNC: 101 MMOL/L (ref 99–110)
CO2: 27 MMOL/L (ref 21–32)
CREAT SERPL-MCNC: 0.5 MG/DL (ref 0.6–1.1)
EOSINOPHILS ABSOLUTE: 0 K/UL (ref 0–0.6)
EOSINOPHILS RELATIVE PERCENT: 0.1 %
GFR AFRICAN AMERICAN: >60
GFR NON-AFRICAN AMERICAN: >60
GLUCOSE BLD-MCNC: 108 MG/DL (ref 70–99)
HCG QUALITATIVE: NEGATIVE
HCT VFR BLD CALC: 30.6 % (ref 36–48)
HEMOGLOBIN: 9.7 G/DL (ref 12–16)
LYMPHOCYTES ABSOLUTE: 1.9 K/UL (ref 1–5.1)
LYMPHOCYTES RELATIVE PERCENT: 30.5 %
MCH RBC QN AUTO: 24.1 PG (ref 26–34)
MCHC RBC AUTO-ENTMCNC: 31.8 G/DL (ref 31–36)
MCV RBC AUTO: 75.7 FL (ref 80–100)
MONOCYTES ABSOLUTE: 0.5 K/UL (ref 0–1.3)
MONOCYTES RELATIVE PERCENT: 7.6 %
NEUTROPHILS ABSOLUTE: 3.7 K/UL (ref 1.7–7.7)
NEUTROPHILS RELATIVE PERCENT: 60.9 %
PDW BLD-RTO: 16.2 % (ref 12.4–15.4)
PLATELET # BLD: 447 K/UL (ref 135–450)
PMV BLD AUTO: 6.3 FL (ref 5–10.5)
POTASSIUM SERPL-SCNC: 4.3 MMOL/L (ref 3.5–5.1)
PRO-BNP: 21 PG/ML (ref 0–124)
RBC # BLD: 4.04 M/UL (ref 4–5.2)
SODIUM BLD-SCNC: 142 MMOL/L (ref 136–145)
TROPONIN: <0.01 NG/ML
WBC # BLD: 6.1 K/UL (ref 4–11)

## 2020-02-17 PROCEDURE — 84484 ASSAY OF TROPONIN QUANT: CPT

## 2020-02-17 PROCEDURE — 6360000002 HC RX W HCPCS: Performed by: NURSE PRACTITIONER

## 2020-02-17 PROCEDURE — 83880 ASSAY OF NATRIURETIC PEPTIDE: CPT

## 2020-02-17 PROCEDURE — 71046 X-RAY EXAM CHEST 2 VIEWS: CPT

## 2020-02-17 PROCEDURE — 2580000003 HC RX 258: Performed by: NURSE PRACTITIONER

## 2020-02-17 PROCEDURE — 84703 CHORIONIC GONADOTROPIN ASSAY: CPT

## 2020-02-17 PROCEDURE — 99285 EMERGENCY DEPT VISIT HI MDM: CPT

## 2020-02-17 PROCEDURE — 80048 BASIC METABOLIC PNL TOTAL CA: CPT

## 2020-02-17 PROCEDURE — 93005 ELECTROCARDIOGRAM TRACING: CPT | Performed by: EMERGENCY MEDICINE

## 2020-02-17 PROCEDURE — 85025 COMPLETE CBC W/AUTO DIFF WBC: CPT

## 2020-02-17 RX ORDER — ONDANSETRON 2 MG/ML
4 INJECTION INTRAMUSCULAR; INTRAVENOUS EVERY 30 MIN PRN
Status: DISCONTINUED | OUTPATIENT
Start: 2020-02-17 | End: 2020-02-17 | Stop reason: HOSPADM

## 2020-02-17 RX ORDER — 0.9 % SODIUM CHLORIDE 0.9 %
1000 INTRAVENOUS SOLUTION INTRAVENOUS ONCE
Status: COMPLETED | OUTPATIENT
Start: 2020-02-17 | End: 2020-02-17

## 2020-02-17 RX ADMIN — ONDANSETRON 4 MG: 2 INJECTION INTRAMUSCULAR; INTRAVENOUS at 18:48

## 2020-02-17 RX ADMIN — SODIUM CHLORIDE 1000 ML: 9 INJECTION, SOLUTION INTRAVENOUS at 18:48

## 2020-02-17 ASSESSMENT — PAIN SCALES - GENERAL: PAINLEVEL_OUTOF10: 6

## 2020-02-17 ASSESSMENT — PAIN DESCRIPTION - PAIN TYPE: TYPE: ACUTE PAIN

## 2020-02-17 ASSESSMENT — PAIN DESCRIPTION - LOCATION: LOCATION: CHEST

## 2020-02-17 NOTE — ED PROVIDER NOTES
[facility] Emergency Department      Pt Name: Jacklyn Hardwick  MRN: [id number]  [place] 1975  Date of evaluation: 2/17/2020  Provider: Nicolle Lamas MD  I independently performed a history and physical on Royden Escort. All diagnostic, treatment, and disposition decisions were made by myself in conjunction with the advanced practice provider. HPI: Jacklyn Hardwick presented with   Chief Complaint   Patient presents with    Chest Pain     chest pain started today, palpatations, weakness, sob, coldness in feet, numbness in feet     Jacklyn Hardwick has a past medical history of Anxiety, Depression, GERD (gastroesophageal reflux disease), IBS (irritable bowel syndrome), and Ulcer disease. She has a past surgical history that includes Adenoidectomy; laparoscopy; Upper gastrointestinal endoscopy (1/28/11); Upper gastrointestinal endoscopy (3-23-11); and Cholecystectomy, laparoscopic (2/22/12). No current facility-administered medications on file prior to encounter. Current Outpatient Medications on File Prior to Encounter   Medication Sig Dispense Refill    ferrous sulfate (FE TABS) 325 (65 Fe) MG EC tablet Take 1 tablet by mouth 2 times daily 60 tablet 5    hydrOXYzine (ATARAX) 25 MG tablet Take 1 tablet by mouth every 8 hours as needed for Anxiety 30 tablet 2    venlafaxine (EFFEXOR XR) 150 MG extended release capsule TAKE 1 CAPSULE BY MOUTH ONE TIME A DAY 90 capsule 0    aspirin-acetaminophen-caffeine (EXCEDRIN MIGRAINE) 250-250-65 MG per tablet Take 1 tablet by mouth every 6 hours as needed for Headaches      SUMAtriptan (IMITREX) 50 MG tablet Take 1 tablet by mouth once as needed for Migraine If no response after 2 hours, repeat. Do not exceed 4 tabs in 24 hours. 9 tablet 0    acetaminophen (TYLENOL) 325 MG tablet Take 325 mg by mouth every 6 hours as needed.  clonazePAM (KLONOPIN) 0.5 MG tablet TAKE ONE TABLET BY MOUTH TWICE A DAY AS NEEDED FOR ANXIETY, FOR UP TO 10 DOSES.   MAY ALSO TRY ONE-HALF TABLET. 10 tablet 0    SUMAtriptan (IMITREX) 50 MG tablet Take 1 tablet by mouth once as needed for Migraine 15 tablet 3     PHYSICAL EXAM  Vitals: BP (!) 133/90   Pulse 101   Temp 98 °F (36.7 °C) (Oral)   Resp 18   Ht 5' 4\" (1.626 m)   Wt 158 lb (71.7 kg)   SpO2 99%   BMI 27.12 kg/m²   Constitutional:  38 y.o. female alert  HENT:  Atraumatic, oral mucosa moist  Neck:  No visible JVD, supple  Chest/Lungs:  Respiratory effort normal, clear, regular  Abdomen:  Non-distended, soft, NT  Back:  No gross deformity  Extremities:  Normal tone and perfusion, light touch sensation intact, no edema  Neurologic:  Alert, speech normal, mentation normal, pupils equal, normal coordination of extremities, no facial asymmetry     Medical Decision Making and Plan: Briefly, this is an 38 y. o.female who presented with concern for chest discomfort. Recent visit to PCP to evaluate anemia. Anemia is mildly improved and she is on an oral supplement. She asked me if we could infuse iron but that is not done in the ED setting. She reports palpitation feeling even when her monitor shows SR with a rate in the 90s. The patient's history and evaluation suggests a less emergent etiology for the discomfort. I do not believe the patient is experiencing symptoms from acute coronary syndrome, aortic dissection, pulmonary embolism, pneumothorax, myocarditis, Boerhaave syndrome, pericardial tamponade, acute abdomen, amongst other emergencies. Kin Lias was given appropriate discharge instructions. Referral to follow up provider. For further details of [facility] encounter, please see documentation by advanced practice provider Velma Langley CNP.     Labs Reviewed   BASIC METABOLIC PANEL - Abnormal; Notable for the following components:       Result Value    Glucose 108 (*)     CREATININE 0.5 (*)     All other components within normal limits    Narrative:     Performed at:  [facility]  [address] [facility] [facility] [address],  [place], [address]   Phone [phone]   CBC WITH AUTO DIFFERENTIAL - Abnormal; Notable for the following components:    Hemoglobin 9.7 (*)     Hematocrit 30.6 (*)     MCV 75.7 (*)     MCH 24.1 (*)     RDW 16.2 (*)     All other components within normal limits    Narrative:     Performed at:  [facility]  [address] [facility] [address],  [place], [address]   Phone [phone]   TROPONIN    Narrative:     Performed at:  [facility]  [address] [facility],  [place], [address]   Phone [phone]   HCG, SERUM, QUALITATIVE    Narrative:     Performed at:  [facility]  [address] [facility] [address],  [place], [address]   Phone [phone]    Narrative:     Performed at:  [facility]  [address] [facility],  [place], [address]   Phone [phone]     RADIOLOGY:     Plain x-rays were viewed by me:   XR CHEST STANDARD (2 VW)   Final Result   No evidence for acute cardiopulmonary pathology. EKG:  Read by me in the absence of a cardiologist shows: Rhythm, normal intervals, normal axis, baseline artifact, delayed R wave progression, no acute injury pattern, no major change from prior study from 2018    Medications administered:  Medications   0.9 % sodium chloride bolus (0 mLs Intravenous Stopped 2/17/20 2030)     FOLLOW UP:    Faith Whyte MD  [address] [place] [address]  [phone]    Schedule an appointment as soon as possible for a visit       Madhavi Lanier MD  [address]    Schedule an appointment as soon as possible for a visit       FINAL IMPRESSION:    1. Palpitations    2.  Anemia, unspecified type       DISPOSITION Decision To Discharge 02/17/2020 08:21:52 PM           Kris Baires MD  02/18/20 [id number]

## 2020-02-18 LAB
EKG ATRIAL RATE: 99 BPM
EKG DIAGNOSIS: NORMAL
EKG P AXIS: 49 DEGREES
EKG P-R INTERVAL: 134 MS
EKG Q-T INTERVAL: 342 MS
EKG QRS DURATION: 72 MS
EKG QTC CALCULATION (BAZETT): 438 MS
EKG R AXIS: 49 DEGREES
EKG T AXIS: 37 DEGREES
EKG VENTRICULAR RATE: 99 BPM

## 2020-02-18 PROCEDURE — 93010 ELECTROCARDIOGRAM REPORT: CPT | Performed by: INTERNAL MEDICINE

## 2020-02-18 NOTE — ED NOTES
Handoff from [address] [place] [address], pt continues on monitor at this time. VSS.       Torrie Boyer RN  82/68/56 [phone]

## 2020-02-18 NOTE — ED PROVIDER NOTES
infarction, pulmonary embolism, thoracic aortic dissection, significant pericarditis, pneumonia, pneumothorax, or acute abdomen. I feel the patient can be safely discharged to home with outpatient follow up. Instructions have been given for the patient to return to the Emergency Department for any worsening of the symptoms, including but not limited to increased pain, shortness of breath, abdominal pain or weakness. FINAL IMPRESSION      1. Palpitations    2. Anemia, unspecified type          DISPOSITION/PLAN   DISPOSITION Decision To Discharge 02/17/2020 08:21:52 PM      PATIENT REFERREDTO:  Georgie Johnson MD  [address] [place] [address]  [phone]    Schedule an appointment as soon as possible for a visit       Marcos Lynn MD  [facility].  [address] [place] [address] [address]    Schedule an appointment as soon as possible for a visit         DISCHARGE MEDICATIONS:  Discharge Medication List as of 2/17/2020  8:36 PM          DISCONTINUED MEDICATIONS:  Discharge Medication List as of 2/17/2020  8:36 PM                 (Please note that portions of this note were completed with a voice recognition program.  Efforts were made to edit the dictations but occasionally words are mis-transcribed.)    RICHARD Schneider CNP (electronically signed)           RICHARD Schneider CNP  02/19/20 5436

## 2020-02-19 ASSESSMENT — ENCOUNTER SYMPTOMS
DIARRHEA: 0
CHEST TIGHTNESS: 0
VOMITING: 0
SHORTNESS OF BREATH: 0
ABDOMINAL PAIN: 0
NAUSEA: 0

## 2020-02-24 ENCOUNTER — NURSE TRIAGE (OUTPATIENT)
Dept: OTHER | Facility: CLINIC | Age: 45
End: 2020-02-24

## 2020-02-24 NOTE — TELEPHONE ENCOUNTER
Reason for Disposition   Caller has already spoken with another triager and has no further questions    Protocols used: NO CONTACT OR DUPLICATE CONTACT CALL-ADULT-[place]    Patient called [facility]) to schedule appointment, with red flag complaint, transferred to RN access for triage. Pt c/o feeling weak and lightheaded x couple of weeks for which she was seen in ER. She is calling to make her f/u apt. She is not worse, therefore, this writer did not triage.

## 2020-02-25 ENCOUNTER — OFFICE VISIT (OUTPATIENT)
Dept: INTERNAL MEDICINE CLINIC | Age: 45
End: 2020-02-25
Payer: COMMERCIAL

## 2020-02-25 VITALS
HEART RATE: 88 BPM | DIASTOLIC BLOOD PRESSURE: 92 MMHG | BODY MASS INDEX: 28.17 KG/M2 | WEIGHT: 165 LBS | HEIGHT: 64 IN | SYSTOLIC BLOOD PRESSURE: 132 MMHG | TEMPERATURE: 99 F

## 2020-02-25 PROCEDURE — 99213 OFFICE O/P EST LOW 20 MIN: CPT | Performed by: NURSE PRACTITIONER

## 2020-02-25 ASSESSMENT — ENCOUNTER SYMPTOMS
NAUSEA: 0
DIARRHEA: 0
SHORTNESS OF BREATH: 0
ABDOMINAL PAIN: 0
WHEEZING: 0
VOMITING: 0
COUGH: 0
CONSTIPATION: 0

## 2020-02-25 NOTE — PATIENT INSTRUCTIONS
Take iron supplements 3x/day- go back down if you experience side effects  Increase dietary iron      Patient Education   Iron-Rich Diet: Care Instructions  Your Care Instructions    Your body needs iron to make hemoglobin. Hemoglobin is a substance in red blood cells that carries oxygen from the lungs to cells all through your body. If you do not get enough iron, your body makes fewer and smaller red blood cells. As a result, your body's cells may not get enough oxygen. Adult men need 8 milligrams of iron a day; adult women need 18 milligrams of iron a day. After menopause, women need 8 milligrams of iron a day. A pregnant woman needs 27 milligrams of iron a day. Infants and young children have higher iron needs relative to their size than other age groups. People who have lost blood because of ulcers or heavy menstrual periods may become very low in iron and may develop anemia. Most people can get the iron their bodies need by eating enough of certain iron-rich foods. Your doctor may recommend that you take an iron supplement along with eating an iron-rich diet. Follow-up care is a key part of your treatment and safety. Be sure to make and go to all appointments, and call your doctor if you are having problems. It's also a good idea to know your test results and keep a list of the medicines you take. How can you care for yourself at home? · Make iron-rich foods a part of your daily diet. Iron-rich foods include:  ? All meats, such as chicken, beef, lamb, pork, fish, and shellfish. Liver is especially high in iron. ? Leafy green vegetables. ? Raisins, peas, beans, lentils, barley, and eggs. ? Iron-fortified breakfast cereals. · Eat foods with vitamin C along with iron-rich foods. Vitamin C helps you absorb more iron from food. Drink a glass of orange juice or another citrus juice with your food. · Eat meat and vegetables or grains together.  The iron in meat helps your body absorb the iron in other

## 2020-02-25 NOTE — PROGRESS NOTES
Acute Office Visit  2/25/2020    SUBJECTIVE:    Patient ID: Neva Jose is a 38 y.o. female. Chief Complaint   Patient presents with    Other     Pt is having issues with anemia and has to get an ablation     HPI: The patient presents to the office for an acute visit. Pt has had that she has fatigue, weakness and palpitations for weeks. She was seen in the ER on 02/17/2020. Heart monitoring in the ER showed sinus rhythm with a rate in the 90s. She states they did an EKG and it was stable. Patient was being worked up for anemia. Recent fatigue and shortness of breath and craving ice was reported. No abnormal bleeding or bruising. Heavy menstrual bleeding does occur -she was referred to an OB/GYN. Has an appt with GYN this Thursday. Getting an ablation. A colonoscopy was recommended- scheduled April 1. Iron supplements were prescribed. Today, pt reports that her \"palpitations have calmed down\" and are not occurring. Has GI and GYN appt scheduled. Appears the ER ordered holter monitor. She has not called to schedule this. Taking iron supplements twice daily. No constipation. No urinary symptoms. Pt reports that she is going through a divorce and is under stress.     Allergies   Allergen Reactions    Latex Rash    Bentyl [Dicyclomine Hcl]      Lip swelling/throat irritation    Dicyclomine     Dilaudid [Hydromorphone Hcl] Itching    Doxycycline Swelling    Escitalopram Hives    Escitalopram Oxalate Hives    Hydromorphone Itching     Current Outpatient Medications   Medication Sig Dispense Refill    ferrous sulfate (FE TABS) 325 (65 Fe) MG EC tablet Take 1 tablet by mouth 2 times daily 60 tablet 5    hydrOXYzine (ATARAX) 25 MG tablet Take 1 tablet by mouth every 8 hours as needed for Anxiety 30 tablet 2    venlafaxine (EFFEXOR XR) 150 MG extended release capsule TAKE 1 CAPSULE BY MOUTH ONE TIME A DAY 90 capsule 0    aspirin-acetaminophen-caffeine (EXCEDRIN MIGRAINE) [phone] MG per tablet

## 2020-03-27 ENCOUNTER — HOSPITAL ENCOUNTER (OUTPATIENT)
Age: 45
Discharge: HOME | End: 2020-03-27
Payer: COMMERCIAL

## 2020-03-27 LAB
BASOPHILS ABSOLUTE: 0 K/UL (ref 0–0.2)
BASOPHILS RELATIVE PERCENT: 0.8 %
EOSINOPHILS ABSOLUTE: 0 K/UL (ref 0–0.6)
EOSINOPHILS RELATIVE PERCENT: 0 %
HCT VFR BLD CALC: 31.4 % (ref 36–48)
HEMOGLOBIN: 10.1 G/DL (ref 12–16)
LYMPHOCYTES ABSOLUTE: 1.5 K/UL (ref 1–5.1)
LYMPHOCYTES RELATIVE PERCENT: 24.3 %
MCH RBC QN AUTO: 24.3 PG (ref 26–34)
MCHC RBC AUTO-ENTMCNC: 32.1 G/DL (ref 31–36)
MCV RBC AUTO: 75.9 FL (ref 80–100)
MONOCYTES ABSOLUTE: 0.4 K/UL (ref 0–1.3)
MONOCYTES RELATIVE PERCENT: 6.8 %
NEUTROPHILS ABSOLUTE: 4.3 K/UL (ref 1.7–7.7)
NEUTROPHILS RELATIVE PERCENT: 68.1 %
PDW BLD-RTO: 16.9 % (ref 12.4–15.4)
PLATELET # BLD: 334 K/UL (ref 135–450)
PMV BLD AUTO: 6.6 FL (ref 5–10.5)
RBC # BLD: 4.14 M/UL (ref 4–5.2)
WBC # BLD: 6.2 K/UL (ref 4–11)

## 2020-03-27 PROCEDURE — 85025 COMPLETE CBC W/AUTO DIFF WBC: CPT

## 2020-03-27 PROCEDURE — 36415 COLL VENOUS BLD VENIPUNCTURE: CPT

## 2020-04-03 ENCOUNTER — NURSE TRIAGE (OUTPATIENT)
Dept: OTHER | Facility: CLINIC | Age: 45
End: 2020-04-03

## 2020-04-06 NOTE — TELEPHONE ENCOUNTER
Called pt to schedule a virtual visit appt with Case Germain for tomorrow.  Lm on [employer]
PREGNANCY: \"Is there any chance you are pregnant? \" \"When was your last menstrual period? \"        No    Protocols used: CHEST PAIN-ADULT-[place]    Patient called [place] [facility]) to schedule appointment, with red flag complaint, transferred to RN access for triage. Pt calls with c/o chest pain, sob, and headaches. Pt reports chest pain 2/10 and heaviness \"like someone is sitting on my chest\". Pt states that she has a strong family history of heart disease.  Symptoms started this morning around 0700 and have been constant

## 2020-04-09 RX ORDER — VENLAFAXINE HYDROCHLORIDE 150 MG/1
CAPSULE, EXTENDED RELEASE ORAL
Qty: 90 CAPSULE | Refills: 0 | Status: SHIPPED | OUTPATIENT
Start: 2020-04-09 | End: 2020-07-08

## 2020-05-21 ENCOUNTER — HOSPITAL ENCOUNTER (OUTPATIENT)
Age: 45
Setting detail: OUTPATIENT SURGERY
Discharge: HOME | End: 2020-05-21
Attending: INTERNAL MEDICINE | Admitting: INTERNAL MEDICINE
Payer: COMMERCIAL

## 2020-05-21 ENCOUNTER — ANESTHESIA (OUTPATIENT)
Dept: ENDOSCOPY | Age: 45
End: 2020-05-21
Payer: COMMERCIAL

## 2020-05-21 ENCOUNTER — ANESTHESIA EVENT (OUTPATIENT)
Dept: ENDOSCOPY | Age: 45
End: 2020-05-21
Payer: COMMERCIAL

## 2020-05-21 VITALS
HEART RATE: 79 BPM | DIASTOLIC BLOOD PRESSURE: 83 MMHG | WEIGHT: 168.8 LBS | RESPIRATION RATE: 16 BRPM | BODY MASS INDEX: 28.82 KG/M2 | SYSTOLIC BLOOD PRESSURE: 120 MMHG | TEMPERATURE: 97.5 F | OXYGEN SATURATION: 100 % | HEIGHT: 64 IN

## 2020-05-21 VITALS — SYSTOLIC BLOOD PRESSURE: 132 MMHG | DIASTOLIC BLOOD PRESSURE: 89 MMHG | OXYGEN SATURATION: 100 %

## 2020-05-21 LAB — HCG(URINE) PREGNANCY TEST: NEGATIVE

## 2020-05-21 PROCEDURE — 2500000003 HC RX 250 WO HCPCS: Performed by: NURSE ANESTHETIST, CERTIFIED REGISTERED

## 2020-05-21 PROCEDURE — 7100000011 HC PHASE II RECOVERY - ADDTL 15 MIN: Performed by: INTERNAL MEDICINE

## 2020-05-21 PROCEDURE — 2580000003 HC RX 258: Performed by: ANESTHESIOLOGY

## 2020-05-21 PROCEDURE — 6360000002 HC RX W HCPCS: Performed by: NURSE ANESTHETIST, CERTIFIED REGISTERED

## 2020-05-21 PROCEDURE — 7100000010 HC PHASE II RECOVERY - FIRST 15 MIN: Performed by: INTERNAL MEDICINE

## 2020-05-21 PROCEDURE — 3609010600 HC COLONOSCOPY POLYPECTOMY SNARE/COLD BIOPSY: Performed by: INTERNAL MEDICINE

## 2020-05-21 PROCEDURE — 88305 TISSUE EXAM BY PATHOLOGIST: CPT

## 2020-05-21 PROCEDURE — 3700000000 HC ANESTHESIA ATTENDED CARE: Performed by: INTERNAL MEDICINE

## 2020-05-21 PROCEDURE — 2580000003 HC RX 258: Performed by: NURSE ANESTHETIST, CERTIFIED REGISTERED

## 2020-05-21 PROCEDURE — 3609012400 HC EGD TRANSORAL BIOPSY SINGLE/MULTIPLE: Performed by: INTERNAL MEDICINE

## 2020-05-21 PROCEDURE — 3700000001 HC ADD 15 MINUTES (ANESTHESIA): Performed by: INTERNAL MEDICINE

## 2020-05-21 PROCEDURE — 2709999900 HC NON-CHARGEABLE SUPPLY: Performed by: INTERNAL MEDICINE

## 2020-05-21 PROCEDURE — 84703 CHORIONIC GONADOTROPIN ASSAY: CPT

## 2020-05-21 RX ORDER — LIDOCAINE HYDROCHLORIDE 20 MG/ML
INJECTION, SOLUTION EPIDURAL; INFILTRATION; INTRACAUDAL; PERINEURAL PRN
Status: DISCONTINUED | OUTPATIENT
Start: 2020-05-21 | End: 2020-05-21 | Stop reason: SDUPTHER

## 2020-05-21 RX ORDER — SODIUM CHLORIDE 9 MG/ML
INJECTION, SOLUTION INTRAVENOUS CONTINUOUS PRN
Status: DISCONTINUED | OUTPATIENT
Start: 2020-05-21 | End: 2020-05-21 | Stop reason: SDUPTHER

## 2020-05-21 RX ORDER — SODIUM CHLORIDE 9 MG/ML
INJECTION, SOLUTION INTRAVENOUS CONTINUOUS
Status: DISCONTINUED | OUTPATIENT
Start: 2020-05-21 | End: 2020-05-21 | Stop reason: HOSPADM

## 2020-05-21 RX ORDER — PROPOFOL 10 MG/ML
INJECTION, EMULSION INTRAVENOUS PRN
Status: DISCONTINUED | OUTPATIENT
Start: 2020-05-21 | End: 2020-05-21 | Stop reason: SDUPTHER

## 2020-05-21 RX ADMIN — SODIUM CHLORIDE: 9 INJECTION, SOLUTION INTRAVENOUS at 10:39

## 2020-05-21 RX ADMIN — LIDOCAINE HYDROCHLORIDE 25 MG: 20 INJECTION, SOLUTION EPIDURAL; INFILTRATION; INTRACAUDAL; PERINEURAL at 10:44

## 2020-05-21 RX ADMIN — PROPOFOL 300 MG: 10 INJECTION, EMULSION INTRAVENOUS at 10:44

## 2020-05-21 RX ADMIN — SODIUM CHLORIDE: 9 INJECTION, SOLUTION INTRAVENOUS at 10:44

## 2020-05-21 ASSESSMENT — PAIN SCALES - GENERAL
PAINLEVEL_OUTOF10: 0

## 2020-05-21 ASSESSMENT — PAIN - FUNCTIONAL ASSESSMENT: PAIN_FUNCTIONAL_ASSESSMENT: 0-10

## 2020-05-21 NOTE — ANESTHESIA PRE PROCEDURE
tablet TAKE ONE TABLET BY MOUTH TWICE A DAY AS NEEDED FOR ANXIETY, FOR UP TO 10 DOSES. MAY ALSO TRY ONE-HALF TABLET. 10 tablet 0    SUMAtriptan (IMITREX) 50 MG tablet Take 1 tablet by mouth once as needed for Migraine 15 tablet 3    aspirin-acetaminophen-caffeine (EXCEDRIN MIGRAINE) [phone] MG per tablet Take 1 tablet by mouth every 6 hours as needed for Headaches      acetaminophen (TYLENOL) 325 MG tablet Take 325 mg by mouth every 6 hours as needed. Allergies:     Allergies   Allergen Reactions    Latex Rash    Bentyl [Dicyclomine Hcl]      Lip swelling/throat irritation    Dicyclomine     Dilaudid [Hydromorphone Hcl] Itching    Doxycycline Swelling    Escitalopram Hives    Escitalopram Oxalate Hives    Hydromorphone Itching       Problem List:    Patient Active Problem List   Diagnosis Code    Lumbar radiculopathy M54.16    Lumbago M54.5    Back pain M54.9    Nonallopathic lesion of lumbar region M99.9    Piriformis syndrome     Acute bronchitis J20.9    Migraine G43.909    Palpitations R00.2    Dysphasia R47.02    Conversion disorder F44.9    Reactive depression F32.9    MIKHAIL (generalized anxiety disorder) F41.1    Dental infection K04.7    Atypical chest pain R07.89    Panic attack as reaction to stress F41.0, F43.0    Seasonal allergies J30.2       Past Medical History:        Diagnosis Date    Anxiety     Anxiety     Depression     Depression     GERD (gastroesophageal reflux disease)     IBS (irritable bowel syndrome)     Ulcer disease        Past Surgical History:        Procedure Laterality Date    ADENOIDECTOMY      CHOLECYSTECTOMY, LAPAROSCOPIC  2/22/12    LAPAROSCOPY      FALLOPIAN TUBES    UPPER GASTROINTESTINAL ENDOSCOPY  1/28/11    UPPER GASTROINTESTINAL ENDOSCOPY  3-23-11       Social History:    Social History     Tobacco Use    Smoking status: Never Smoker    Smokeless tobacco: Never Used   Substance Use Topics    Alcohol use: Yes     Comment: SOCIAL                                Counseling given: Not Answered      Vital Signs (Current):   Vitals:    05/19/20 1646   Weight: 165 lb (74.8 kg)   Height: 5' 4\" (1.626 m)                                              BP Readings from Last 3 Encounters:   02/25/20 (!) 132/92   02/17/20 (!) 152/90   02/10/20 136/84       NPO Status:                                                                                 BMI:   Wt Readings from Last 3 Encounters:   05/19/20 165 lb (74.8 kg)   02/25/20 165 lb (74.8 kg)   02/17/20 158 lb (71.7 kg)     Body mass index is 28.32 kg/m². CBC:   Lab Results   Component Value Date    WBC 6.2 03/27/2020    RBC 4.14 03/27/2020    HGB 10.1 03/27/2020    HCT 31.4 03/27/2020    MCV 75.9 03/27/2020    RDW 16.9 03/27/2020     03/27/2020       CMP:   Lab Results   Component Value Date     02/17/2020    K 4.3 02/17/2020     02/17/2020    CO2 27 02/17/2020    BUN 9 02/17/2020    CREATININE 0.5 02/17/2020    GFRAA >60 02/17/2020    GFRAA >60 03/01/2013    AGRATIO 1.2 07/30/2018    LABGLOM >60 02/17/2020    GLUCOSE 108 02/17/2020    PROT 8.0 07/30/2018    PROT 8.3 03/01/2012    CALCIUM 9.2 02/17/2020    BILITOT <0.2 07/30/2018    ALKPHOS 59 07/30/2018    AST 14 07/30/2018    ALT 11 07/30/2018       POC Tests: No results for input(s): POCGLU, POCNA, POCK, POCCL, POCBUN, POCHEMO, POCHCT in the last 72 hours.     Coags: No results found for: PROTIME, INR, APTT    HCG (If Applicable):   Lab Results   Component Value Date    PREGTESTUR negative 02/13/2017        ABGs: No results found for: PHART, PO2ART, AOX6FTO, BFX9NUU, BEART, W9GGSLJU     Type & Screen (If Applicable):  Lab Results   Component Value Date    LABABO A 01/27/2011    [facility] Positive 01/27/2011       Drug/Infectious Status (If Applicable):  No results found for: HIV, HEPCAB    COVID-19 Screening (If Applicable):   Lab Results   Component Value Date    COVID19 Not Detected 05/18/2020         Anesthesia

## 2020-05-21 NOTE — ANESTHESIA POSTPROCEDURE EVALUATION
Department of Anesthesiology  Postprocedure Note    Patient: Black Daniel  MRN: [id number]  YOB: 1975  Date of evaluation: 5/21/2020  Time:  11:58 AM     Procedure Summary     Date:  05/21/20 Room / Location:  [address] [place] [address]    Anesthesia Start:  8371 Anesthesia Stop:  3557    Procedures:       COLONOSCOPY POLYPECTOMY SNARE/COLD BIOPSY (N/A )      EGD BIOPSY (N/A Abdomen) Diagnosis:  (IRON DEFICIENCY ANEMIA D50.9)    Surgeon:  Gulshan Jackson MD Responsible Provider:  Nilesh Sanchez MD    Anesthesia Type:  MAC ASA Status:  2          Anesthesia Type: MAC    Ulisses Phase I: Ulisses Score: 10    Ulisses Phase II: Ulisses Score: 10    Last vitals: Reviewed and per EMR flowsheets.        Anesthesia Post Evaluation    Patient location during evaluation: PACU  Patient participation: complete - patient participated  Level of consciousness: awake and awake and alert  Pain score: 2  Airway patency: patent  Nausea & Vomiting: no vomiting  Complications: no  Cardiovascular status: blood pressure returned to baseline  Respiratory status: acceptable  Hydration status: euvolemic

## 2020-05-21 NOTE — H&P
[address] [place] [address]    Pre-operative History and Physical    Patient: Francoise Arias  : 1975  CSN:     History Obtained From:   Patient or guardian. HISTORY OF PRESENT ILLNESS:    The patient is a 54 y.o. female here for Endoscopy. Past Medical History:    Past Medical History:   Diagnosis Date    Anxiety     Anxiety     Depression     Depression     GERD (gastroesophageal reflux disease)     IBS (irritable bowel syndrome)     Ulcer disease      Past Surgical History:    Past Surgical History:   Procedure Laterality Date    ADENOIDECTOMY      CHOLECYSTECTOMY, LAPAROSCOPIC  12    LAPAROSCOPY      FALLOPIAN TUBES    UPPER GASTROINTESTINAL ENDOSCOPY  11    UPPER GASTROINTESTINAL ENDOSCOPY  3-23-11     Medications Prior to Admission:   No current facility-administered medications on file prior to encounter. Current Outpatient Medications on File Prior to Encounter   Medication Sig Dispense Refill    ferrous sulfate (FE TABS) 325 (65 Fe) MG EC tablet Take 1 tablet by mouth 2 times daily 60 tablet 5    hydrOXYzine (ATARAX) 25 MG tablet Take 1 tablet by mouth every 8 hours as needed for Anxiety 30 tablet 2    acetaminophen (TYLENOL) 325 MG tablet Take 325 mg by mouth every 6 hours as needed.  venlafaxine (EFFEXOR XR) 150 MG extended release capsule TAKE 1 CAPSULE BY MOUTH ONE TIME A DAY 90 capsule 0    clonazePAM (KLONOPIN) 0.5 MG tablet TAKE ONE TABLET BY MOUTH TWICE A DAY AS NEEDED FOR ANXIETY, FOR UP TO 10 DOSES. MAY ALSO TRY ONE-HALF TABLET. 10 tablet 0    SUMAtriptan (IMITREX) 50 MG tablet Take 1 tablet by mouth once as needed for Migraine 15 tablet 3    aspirin-acetaminophen-caffeine (EXCEDRIN MIGRAINE) [phone] MG per tablet Take 1 tablet by mouth every 6 hours as needed for Headaches          Allergies:  Latex; Doxycycline; Bentyl [dicyclomine hcl];  Dicyclomine; Dilaudid [hydromorphone hcl]; Escitalopram; Escitalopram oxalate; Hydromorphone; and Prozac

## 2020-06-09 ENCOUNTER — VIRTUAL VISIT (OUTPATIENT)
Dept: INTERNAL MEDICINE CLINIC | Age: 45
End: 2020-06-09
Payer: COMMERCIAL

## 2020-06-09 ENCOUNTER — OFFICE VISIT (OUTPATIENT)
Dept: PRIMARY CARE CLINIC | Age: 45
End: 2020-06-09

## 2020-06-09 PROCEDURE — 99213 OFFICE O/P EST LOW 20 MIN: CPT | Performed by: NURSE PRACTITIONER

## 2020-06-09 RX ORDER — LANOLIN ALCOHOL/MO/W.PET/CERES
325 CREAM (GRAM) TOPICAL
Qty: 90 TABLET | Refills: 0 | Status: SHIPPED | OUTPATIENT
Start: 2020-06-09 | End: 2021-01-18 | Stop reason: ALTCHOICE

## 2020-06-09 ASSESSMENT — ENCOUNTER SYMPTOMS
CONSTIPATION: 0
ABDOMINAL PAIN: 0
SHORTNESS OF BREATH: 0
COUGH: 0
WHEEZING: 0
BLOOD IN STOOL: 0
VOMITING: 0
DIARRHEA: 0
NAUSEA: 1

## 2020-06-09 NOTE — LETTER
[facility]  [address] [address]  Phone: [phone]  Fax: [address], APRN - [address]        June 9, 2020     Patient: Reena Higginbotham   YOB: 1975   Date of Visit: 6/9/2020       To Whom it May Concern:    Reena Higginbotham was seen virtually on 6/9/2020. She may return to work on 06/11/2020. If you have any questions or concerns, please don't hesitate to call.     Sincerely,         Vanda Humphries, APRN - CNP

## 2020-06-09 NOTE — PROGRESS NOTES
TELEHEALTH EVALUATION -- Audio/Visual (During MSVLC-46 public health emergency)  Follow Up Office Visit  6/9/2020    SUBJECTIVE:    Patient ID: [place] Ngo is a 54 y.o. female. Chief Complaint   Patient presents with    Medication Refill     iron      HPI: The patient presents for an acute visit. Anemia-   Heavy menstrual bleeding occurs -she was referred to an OB/GYN. Ablation was cancelled d/t COVID-19 outbreak. Rescheduled- ablation is now on 06/16/2020. A colonoscopy was completed- states she had one polyp which was removed. Now having more fatigue/weakness/intermittent nausea. Intermittent palpitations. Taking 2 iron supplements daily without side effects. No abnormal bleeding/bruising. No chest pain, shortness of breath, trouble breathing, lightheadedness, dizziness or blurred vision. She thinks her iron is low again. Allergies   Allergen Reactions    Latex Swelling     Face and lips.  Doxycycline Anaphylaxis     Throat swelled    Bentyl [Dicyclomine Hcl]      Lip swelling/throat irritation    Dicyclomine     Dilaudid [Hydromorphone Hcl] Itching    Escitalopram Hives    Escitalopram Oxalate Hives    Hydromorphone Itching    Prozac [Fluoxetine Hcl]      Patient stated,\"I felt weird\"     Current Outpatient Medications   Medication Sig Dispense Refill    ferrous sulfate (FE TABS) 325 (65 Fe) MG EC tablet Take 1 tablet by mouth 3 times daily (with meals) 90 tablet 0    venlafaxine (EFFEXOR XR) 150 MG extended release capsule TAKE 1 CAPSULE BY MOUTH ONE TIME A DAY 90 capsule 0    hydrOXYzine (ATARAX) 25 MG tablet Take 1 tablet by mouth every 8 hours as needed for Anxiety 30 tablet 2    clonazePAM (KLONOPIN) 0.5 MG tablet TAKE ONE TABLET BY MOUTH TWICE A DAY AS NEEDED FOR ANXIETY, FOR UP TO 10 DOSES. MAY ALSO TRY ONE-HALF TABLET.  10 tablet 0    SUMAtriptan (IMITREX) 50 MG tablet Take 1 tablet by mouth once as needed for Migraine 15 tablet 3    aspirin-acetaminophen-caffeine weakness with intermittent palpitations.  - Recommended labs for evaluation and increasing iron supplements at this time. Continue with OB/GYN. - Pt would like a work note provided so she is off work tomorrow in order to get labs. Letter provided. - ferrous sulfate (FE TABS) 325 (65 Fe) MG EC tablet; Take 1 tablet by mouth 3 times daily (with meals)- increased dose. -     CBC Auto Differential; Future  -     IRON AND TIBC; Future  -     FERRITIN; Future  - BMP; Future  - Red flag warning signs reviewed with the patient and she will go to the ER if these occur    Return for as previously scheduled or sooner if needed. [place] Ngo is a 54 y.o. female being evaluated by a Virtual Visit (video visit) encounter to address concerns as mentioned above. A caregiver was present when appropriate. Due to this being a TeleHealth encounter (During JNDZA-79 public health emergency), evaluation of the following organ systems was limited: Vitals/Constitutional/EENT/Resp/CV/GI//MS/Neuro/Skin/Heme-Lymph-Imm. Pursuant to the emergency declaration under the [facility], [address] [place] [address] authority and the [employer] Act, this Virtual Visit was conducted with patient's (and/or legal guardian's) consent, to reduce the patient's risk of exposure to COVID-19 and provide necessary medical care. The patient (and/or legal guardian) has also been advised to contact this office for worsening conditions or problems, and seek emergency medical treatment and/or call 911 if deemed necessary. Patient identification was verified at the start of the visit: Yes    Total time spent on this encounter: Not billed by time    Services were provided through a video synchronous discussion virtually to substitute for in-person clinic visit. Patient and provider were located at their individual homes. All questions answered.  Pt states no further questions or

## 2020-06-11 LAB
SARS-COV-2: NOT DETECTED
SOURCE: NORMAL

## 2020-06-16 ENCOUNTER — ANESTHESIA (OUTPATIENT)
Dept: OPERATING ROOM | Age: 45
End: 2020-06-16
Payer: COMMERCIAL

## 2020-06-16 ENCOUNTER — HOSPITAL ENCOUNTER (OUTPATIENT)
Age: 45
Setting detail: OUTPATIENT SURGERY
Discharge: HOME | End: 2020-06-16
Attending: OBSTETRICS & GYNECOLOGY | Admitting: OBSTETRICS & GYNECOLOGY
Payer: COMMERCIAL

## 2020-06-16 ENCOUNTER — ANESTHESIA EVENT (OUTPATIENT)
Dept: OPERATING ROOM | Age: 45
End: 2020-06-16
Payer: COMMERCIAL

## 2020-06-16 VITALS
BODY MASS INDEX: 28.32 KG/M2 | TEMPERATURE: 97.6 F | SYSTOLIC BLOOD PRESSURE: 124 MMHG | HEIGHT: 65 IN | OXYGEN SATURATION: 99 % | HEART RATE: 77 BPM | RESPIRATION RATE: 11 BRPM | DIASTOLIC BLOOD PRESSURE: 86 MMHG | WEIGHT: 170 LBS

## 2020-06-16 VITALS
RESPIRATION RATE: 6 BRPM | OXYGEN SATURATION: 99 % | DIASTOLIC BLOOD PRESSURE: 59 MMHG | SYSTOLIC BLOOD PRESSURE: 103 MMHG

## 2020-06-16 LAB — HCG(URINE) PREGNANCY TEST: NEGATIVE

## 2020-06-16 PROCEDURE — 6360000002 HC RX W HCPCS: Performed by: ANESTHESIOLOGY

## 2020-06-16 PROCEDURE — 3600000004 HC SURGERY LEVEL 4 BASE: Performed by: OBSTETRICS & GYNECOLOGY

## 2020-06-16 PROCEDURE — 7100000001 HC PACU RECOVERY - ADDTL 15 MIN: Performed by: OBSTETRICS & GYNECOLOGY

## 2020-06-16 PROCEDURE — 7100000000 HC PACU RECOVERY - FIRST 15 MIN: Performed by: OBSTETRICS & GYNECOLOGY

## 2020-06-16 PROCEDURE — 6360000002 HC RX W HCPCS: Performed by: NURSE ANESTHETIST, CERTIFIED REGISTERED

## 2020-06-16 PROCEDURE — 88305 TISSUE EXAM BY PATHOLOGIST: CPT

## 2020-06-16 PROCEDURE — 3700000000 HC ANESTHESIA ATTENDED CARE: Performed by: OBSTETRICS & GYNECOLOGY

## 2020-06-16 PROCEDURE — 2709999900 HC NON-CHARGEABLE SUPPLY: Performed by: OBSTETRICS & GYNECOLOGY

## 2020-06-16 PROCEDURE — 3600000014 HC SURGERY LEVEL 4 ADDTL 15MIN: Performed by: OBSTETRICS & GYNECOLOGY

## 2020-06-16 PROCEDURE — 2580000003 HC RX 258: Performed by: NURSE ANESTHETIST, CERTIFIED REGISTERED

## 2020-06-16 PROCEDURE — 2500000003 HC RX 250 WO HCPCS: Performed by: NURSE ANESTHETIST, CERTIFIED REGISTERED

## 2020-06-16 PROCEDURE — 6360000002 HC RX W HCPCS: Performed by: OBSTETRICS & GYNECOLOGY

## 2020-06-16 PROCEDURE — 2720000010 HC SURG SUPPLY STERILE: Performed by: OBSTETRICS & GYNECOLOGY

## 2020-06-16 PROCEDURE — 2580000003 HC RX 258: Performed by: OBSTETRICS & GYNECOLOGY

## 2020-06-16 PROCEDURE — 7100000010 HC PHASE II RECOVERY - FIRST 15 MIN: Performed by: OBSTETRICS & GYNECOLOGY

## 2020-06-16 PROCEDURE — 7100000011 HC PHASE II RECOVERY - ADDTL 15 MIN: Performed by: OBSTETRICS & GYNECOLOGY

## 2020-06-16 PROCEDURE — 84703 CHORIONIC GONADOTROPIN ASSAY: CPT

## 2020-06-16 PROCEDURE — 3700000001 HC ADD 15 MINUTES (ANESTHESIA): Performed by: OBSTETRICS & GYNECOLOGY

## 2020-06-16 RX ORDER — LIDOCAINE HYDROCHLORIDE 10 MG/ML
1 INJECTION, SOLUTION EPIDURAL; INFILTRATION; INTRACAUDAL; PERINEURAL
Status: DISCONTINUED | OUTPATIENT
Start: 2020-06-16 | End: 2020-06-16 | Stop reason: HOSPADM

## 2020-06-16 RX ORDER — HYDROCODONE BITARTRATE AND ACETAMINOPHEN 5; 325 MG/1; MG/1
1 TABLET ORAL EVERY 4 HOURS PRN
Qty: 18 TABLET | Refills: 0 | Status: SHIPPED | OUTPATIENT
Start: 2020-06-16 | End: 2020-06-19

## 2020-06-16 RX ORDER — DEXAMETHASONE SODIUM PHOSPHATE 4 MG/ML
INJECTION, SOLUTION INTRA-ARTICULAR; INTRALESIONAL; INTRAMUSCULAR; INTRAVENOUS; SOFT TISSUE PRN
Status: DISCONTINUED | OUTPATIENT
Start: 2020-06-16 | End: 2020-06-16 | Stop reason: SDUPTHER

## 2020-06-16 RX ORDER — FENTANYL CITRATE 50 UG/ML
50 INJECTION, SOLUTION INTRAMUSCULAR; INTRAVENOUS EVERY 5 MIN PRN
Status: DISCONTINUED | OUTPATIENT
Start: 2020-06-16 | End: 2020-06-16 | Stop reason: HOSPADM

## 2020-06-16 RX ORDER — SODIUM CHLORIDE, SODIUM LACTATE, POTASSIUM CHLORIDE, CALCIUM CHLORIDE 600; 310; 30; 20 MG/100ML; MG/100ML; MG/100ML; MG/100ML
INJECTION, SOLUTION INTRAVENOUS CONTINUOUS PRN
Status: DISCONTINUED | OUTPATIENT
Start: 2020-06-16 | End: 2020-06-16 | Stop reason: SDUPTHER

## 2020-06-16 RX ORDER — SODIUM CHLORIDE 0.9 % (FLUSH) 0.9 %
10 SYRINGE (ML) INJECTION PRN
Status: DISCONTINUED | OUTPATIENT
Start: 2020-06-16 | End: 2020-06-16 | Stop reason: HOSPADM

## 2020-06-16 RX ORDER — SODIUM CHLORIDE 9 MG/ML
INJECTION, SOLUTION INTRAVENOUS CONTINUOUS
Status: DISCONTINUED | OUTPATIENT
Start: 2020-06-16 | End: 2020-06-16 | Stop reason: HOSPADM

## 2020-06-16 RX ORDER — FENTANYL CITRATE 50 UG/ML
25 INJECTION, SOLUTION INTRAMUSCULAR; INTRAVENOUS EVERY 5 MIN PRN
Status: DISCONTINUED | OUTPATIENT
Start: 2020-06-16 | End: 2020-06-16 | Stop reason: HOSPADM

## 2020-06-16 RX ORDER — DIPHENHYDRAMINE HYDROCHLORIDE 50 MG/ML
12.5 INJECTION INTRAMUSCULAR; INTRAVENOUS
Status: DISCONTINUED | OUTPATIENT
Start: 2020-06-16 | End: 2020-06-16 | Stop reason: HOSPADM

## 2020-06-16 RX ORDER — LIDOCAINE HYDROCHLORIDE 10 MG/ML
0.5 INJECTION, SOLUTION EPIDURAL; INFILTRATION; INTRACAUDAL; PERINEURAL ONCE
Status: DISCONTINUED | OUTPATIENT
Start: 2020-06-16 | End: 2020-06-16 | Stop reason: HOSPADM

## 2020-06-16 RX ORDER — LIDOCAINE HYDROCHLORIDE 20 MG/ML
INJECTION, SOLUTION EPIDURAL; INFILTRATION; INTRACAUDAL; PERINEURAL PRN
Status: DISCONTINUED | OUTPATIENT
Start: 2020-06-16 | End: 2020-06-16 | Stop reason: SDUPTHER

## 2020-06-16 RX ORDER — MEPERIDINE HYDROCHLORIDE 25 MG/ML
12.5 INJECTION INTRAMUSCULAR; INTRAVENOUS; SUBCUTANEOUS EVERY 5 MIN PRN
Status: DISCONTINUED | OUTPATIENT
Start: 2020-06-16 | End: 2020-06-16 | Stop reason: HOSPADM

## 2020-06-16 RX ORDER — ONDANSETRON 2 MG/ML
INJECTION INTRAMUSCULAR; INTRAVENOUS PRN
Status: DISCONTINUED | OUTPATIENT
Start: 2020-06-16 | End: 2020-06-16 | Stop reason: SDUPTHER

## 2020-06-16 RX ORDER — PROPOFOL 10 MG/ML
INJECTION, EMULSION INTRAVENOUS PRN
Status: DISCONTINUED | OUTPATIENT
Start: 2020-06-16 | End: 2020-06-16 | Stop reason: SDUPTHER

## 2020-06-16 RX ORDER — MIDAZOLAM HYDROCHLORIDE 1 MG/ML
INJECTION INTRAMUSCULAR; INTRAVENOUS PRN
Status: DISCONTINUED | OUTPATIENT
Start: 2020-06-16 | End: 2020-06-16 | Stop reason: SDUPTHER

## 2020-06-16 RX ORDER — SODIUM CHLORIDE 0.9 % (FLUSH) 0.9 %
10 SYRINGE (ML) INJECTION EVERY 12 HOURS SCHEDULED
Status: DISCONTINUED | OUTPATIENT
Start: 2020-06-16 | End: 2020-06-16 | Stop reason: HOSPADM

## 2020-06-16 RX ORDER — PROMETHAZINE HYDROCHLORIDE 25 MG/ML
6.25 INJECTION, SOLUTION INTRAMUSCULAR; INTRAVENOUS EVERY 30 MIN PRN
Status: DISCONTINUED | OUTPATIENT
Start: 2020-06-16 | End: 2020-06-16 | Stop reason: HOSPADM

## 2020-06-16 RX ORDER — SODIUM CHLORIDE, SODIUM LACTATE, POTASSIUM CHLORIDE, CALCIUM CHLORIDE 600; 310; 30; 20 MG/100ML; MG/100ML; MG/100ML; MG/100ML
INJECTION, SOLUTION INTRAVENOUS CONTINUOUS
Status: DISCONTINUED | OUTPATIENT
Start: 2020-06-16 | End: 2020-06-16 | Stop reason: HOSPADM

## 2020-06-16 RX ADMIN — ONDANSETRON 4 MG: 2 INJECTION INTRAMUSCULAR; INTRAVENOUS at 09:39

## 2020-06-16 RX ADMIN — LIDOCAINE HYDROCHLORIDE 100 MG: 20 INJECTION, SOLUTION EPIDURAL; INFILTRATION; INTRACAUDAL; PERINEURAL at 09:34

## 2020-06-16 RX ADMIN — SODIUM CHLORIDE, POTASSIUM CHLORIDE, SODIUM LACTATE AND CALCIUM CHLORIDE: 600; 310; 30; 20 INJECTION, SOLUTION INTRAVENOUS at 09:32

## 2020-06-16 RX ADMIN — SODIUM CHLORIDE, POTASSIUM CHLORIDE, SODIUM LACTATE AND CALCIUM CHLORIDE: 600; 310; 30; 20 INJECTION, SOLUTION INTRAVENOUS at 08:59

## 2020-06-16 RX ADMIN — PROPOFOL 200 MG: 10 INJECTION, EMULSION INTRAVENOUS at 09:34

## 2020-06-16 RX ADMIN — FENTANYL CITRATE 50 MCG: 50 INJECTION, SOLUTION INTRAMUSCULAR; INTRAVENOUS at 09:42

## 2020-06-16 RX ADMIN — MIDAZOLAM 2 MG: 1 INJECTION INTRAMUSCULAR; INTRAVENOUS at 09:33

## 2020-06-16 RX ADMIN — FENTANYL CITRATE 50 MCG: 50 INJECTION, SOLUTION INTRAMUSCULAR; INTRAVENOUS at 09:33

## 2020-06-16 RX ADMIN — CEFAZOLIN SODIUM 2 G: 10 INJECTION, POWDER, FOR SOLUTION INTRAVENOUS at 09:30

## 2020-06-16 RX ADMIN — DEXAMETHASONE SODIUM PHOSPHATE 8 MG: 4 INJECTION, SOLUTION INTRAMUSCULAR; INTRAVENOUS at 09:47

## 2020-06-16 ASSESSMENT — PULMONARY FUNCTION TESTS
PIF_VALUE: 21
PIF_VALUE: 17
PIF_VALUE: 21
PIF_VALUE: 21
PIF_VALUE: 1
PIF_VALUE: 0
PIF_VALUE: 19
PIF_VALUE: 3
PIF_VALUE: 21
PIF_VALUE: 17
PIF_VALUE: 4
PIF_VALUE: 3
PIF_VALUE: 15
PIF_VALUE: 20
PIF_VALUE: 20
PIF_VALUE: 0
PIF_VALUE: 4
PIF_VALUE: 19
PIF_VALUE: 13
PIF_VALUE: 18
PIF_VALUE: 0
PIF_VALUE: 19
PIF_VALUE: 20
PIF_VALUE: 20
PIF_VALUE: 16
PIF_VALUE: 1
PIF_VALUE: 12
PIF_VALUE: 4
PIF_VALUE: 21
PIF_VALUE: 4
PIF_VALUE: 2

## 2020-06-16 ASSESSMENT — PAIN DESCRIPTION - DESCRIPTORS: DESCRIPTORS: CRAMPING

## 2020-06-16 ASSESSMENT — PAIN - FUNCTIONAL ASSESSMENT: PAIN_FUNCTIONAL_ASSESSMENT: 0-10

## 2020-06-16 NOTE — OP NOTE
Collected by Time Destination   A : A. ENDOMETRIAL CURETTINGS Tissue Tissue SURGICAL PATHOLOGY Joanna Parada MD 6/16/2020 8749        Implants:  * No implants in log *      Drains: * No LDAs found *    Findings: normal uterine cavity, small polyps    Detailed Description of Procedure:   See op note above    Electronically signed by Joanna Parada MD on 6/16/2020 at 9:56 AM

## 2020-06-16 NOTE — ANESTHESIA POSTPROCEDURE EVALUATION
Department of Anesthesiology  Postprocedure Note    Patient: Cyndi Pacheco  MRN: [id number]  YOB: 1975  Date of evaluation: 6/16/2020  Time:  10:40 AM     Procedure Summary     Date:  06/16/20 Room / Location:  [address]    Anesthesia Start:  0932 Anesthesia Stop:  1011    Procedure:  HYSTEROSCOPY DILATATION AND CURETTAGE WITH EMMANUEL ENDOMETRIAL ABLATION (N/A ) Diagnosis:  (MENOMETRORRHAGIA, ANEMIA N92.0, D50.0)    Surgeon:  Cortney Tran MD Responsible Provider:  Vicki Reyes MD    Anesthesia Type:  general ASA Status:  2          Anesthesia Type: general    Ulisses Phase I: Ulisses Score: 8    Ulisses Phase II: Ulisses Score: 10    Last vitals: Reviewed and per EMR flowsheets.        Anesthesia Post Evaluation    Patient location during evaluation: PACU  Patient participation: complete - patient participated  Level of consciousness: awake and alert  Pain score: 2  Airway patency: patent  Nausea & Vomiting: no vomiting  Complications: no  Cardiovascular status: blood pressure returned to baseline  Respiratory status: acceptable  Hydration status: euvolemic

## 2020-06-16 NOTE — ANESTHESIA PRE PROCEDURE
DAY AS NEEDED FOR ANXIETY, FOR UP TO 10 DOSES. MAY ALSO TRY ONE-HALF TABLET. 10 tablet 0    SUMAtriptan (IMITREX) 50 MG tablet Take 1 tablet by mouth once as needed for Migraine 15 tablet 3    aspirin-acetaminophen-caffeine (EXCEDRIN MIGRAINE) 701-204-06 MG per tablet Take 1 tablet by mouth every 6 hours as needed for Headaches      acetaminophen (TYLENOL) 325 MG tablet Take 325 mg by mouth every 6 hours as needed. No current facility-administered medications for this visit. Allergies: Allergies   Allergen Reactions    Latex Swelling     Face and lips.     Doxycycline Anaphylaxis     Throat swelled    Bentyl [Dicyclomine Hcl]      Lip swelling/throat irritation    Dicyclomine     Dilaudid [Hydromorphone Hcl] Itching    Escitalopram Hives    Escitalopram Oxalate Hives    Hydromorphone Itching    Prozac [Fluoxetine Hcl]      Patient stated,\"I felt weird\"       Problem List:    Patient Active Problem List   Diagnosis Code    Lumbar radiculopathy M54.16    Lumbago M54.5    Back pain M54.9    Nonallopathic lesion of lumbar region M99.9    Piriformis syndrome     Acute bronchitis J20.9    Migraine G43.909    Palpitations R00.2    Dysphasia R47.02    Conversion disorder F44.9    Reactive depression F32.9    MIKHAIL (generalized anxiety disorder) F41.1    Dental infection K04.7    Atypical chest pain R07.89    Panic attack as reaction to stress F41.0, F43.0    Seasonal allergies J30.2       Past Medical History:        Diagnosis Date    Anxiety     Anxiety     Depression     Depression     GERD (gastroesophageal reflux disease)     IBS (irritable bowel syndrome)     Ulcer disease        Past Surgical History:        Procedure Laterality Date    ADENOIDECTOMY      CHOLECYSTECTOMY, LAPAROSCOPIC  2/22/12    COLONOSCOPY N/A 5/21/2020    COLONOSCOPY POLYPECTOMY SNARE/COLD BIOPSY performed by Uday Ferguson MD at [facility] GASTROINTESTINAL ENDOSCOPY  1/28/11    UPPER GASTROINTESTINAL ENDOSCOPY  3-23-11    UPPER GASTROINTESTINAL ENDOSCOPY N/A 5/21/2020    EGD BIOPSY performed by Savannah Parker MD at [address] History:    Social History     Tobacco Use    Smoking status: Never Smoker    Smokeless tobacco: Never Used   Substance Use Topics    Alcohol use: Yes     Comment: SOCIAL                                Counseling given: Not Answered      Vital Signs (Current): There were no vitals filed for this visit. BP Readings from Last 3 Encounters:   05/21/20 120/83   05/21/20 132/89   02/25/20 (!) 132/92       NPO Status:                                                                                 BMI:   Wt Readings from Last 3 Encounters:   06/15/20 165 lb (74.8 kg)   05/21/20 168 lb 12.8 oz (76.6 kg)   02/25/20 165 lb (74.8 kg)     There is no height or weight on file to calculate BMI.    CBC:   Lab Results   Component Value Date    WBC 5.9 06/10/2020    RBC 4.55 06/10/2020    HGB 11.4 06/10/2020    HCT 36.2 06/10/2020    MCV 79.5 06/10/2020    RDW 17.8 06/10/2020     06/10/2020       CMP:   Lab Results   Component Value Date     06/10/2020    K 4.6 06/10/2020     06/10/2020    CO2 25 06/10/2020    BUN 8 06/10/2020    CREATININE 0.6 06/10/2020    GFRAA >60 06/10/2020    GFRAA >60 03/01/2013    AGRATIO 1.2 07/30/2018    LABGLOM >60 06/10/2020    GLUCOSE 98 06/10/2020    PROT 8.0 07/30/2018    PROT 8.3 03/01/2012    CALCIUM 9.6 06/10/2020    BILITOT <0.2 07/30/2018    ALKPHOS 59 07/30/2018    AST 14 07/30/2018    ALT 11 07/30/2018       POC Tests: No results for input(s): POCGLU, POCNA, POCK, POCCL, POCBUN, POCHEMO, POCHCT in the last 72 hours.     Coags: No results found for: PROTIME, INR, APTT    HCG (If Applicable):   Lab Results   Component Value Date    PREGTESTUR Negative 05/21/2020        ABGs: No results found for: PHART, PO2ART, RZU0FIT, HID9GST, BEART, I8BWVIVZ     Type & Screen (If Applicable):  Lab Results   Component Value Date    LABABO A 01/27/2011    [facility] [place] Positive 01/27/2011       Drug/Infectious Status (If Applicable):  No results found for: HIV, HEPCAB    COVID-19 Screening (If Applicable):   Lab Results   Component Value Date    COVID19 Not Detected 06/09/2020    COVID19 Not Detected 05/18/2020         Anesthesia Evaluation  Patient summary reviewed and Nursing notes reviewed  Airway: Mallampati: II  TM distance: >3 FB   Neck ROM: full  Mouth opening: > = 3 FB Dental:          Pulmonary:                              Cardiovascular:  Exercise tolerance: good (>4 METS),                     Neuro/Psych:   (+) neuromuscular disease (lumbar radiculopathy):, headaches: migraine headaches, psychiatric history (panic attacks): stable with treatmentdepression/anxiety             GI/Hepatic/Renal:   (+) GERD: well controlled, PUD,           Endo/Other:                     Abdominal:           Vascular:                                          Anesthesia Plan      general     ASA 2       Induction: intravenous and rapid sequence. MIPS: Postoperative opioids intended, Prophylactic antiemetics administered and Postoperative trial extubation. Anesthetic plan and risks discussed with patient. Plan discussed with CRNA.                   Gloria Plaza MD   6/16/2020

## 2020-06-29 ENCOUNTER — TELEPHONE (OUTPATIENT)
Dept: INTERNAL MEDICINE CLINIC | Age: 45
End: 2020-06-29

## 2020-06-29 NOTE — TELEPHONE ENCOUNTER
Pt advised that Jesus Gaona would not be filling out the FMLA forms per multiple message from Jesus Gaona.

## 2020-07-02 LAB
BASOPHILS ABSOLUTE: 0 K/UL (ref 0–0.2)
BASOPHILS RELATIVE PERCENT: 0.4 %
EOSINOPHILS ABSOLUTE: 0 K/UL (ref 0–0.6)
EOSINOPHILS RELATIVE PERCENT: 0 %
HCT VFR BLD CALC: 35.7 % (ref 36–48)
HEMOGLOBIN: 11.4 G/DL (ref 12–16)
IRON: 49 UG/DL (ref 37–145)
LYMPHOCYTES ABSOLUTE: 1.5 K/UL (ref 1–5.1)
LYMPHOCYTES RELATIVE PERCENT: 31.2 %
MCH RBC QN AUTO: 25.9 PG (ref 26–34)
MCHC RBC AUTO-ENTMCNC: 32 G/DL (ref 31–36)
MCV RBC AUTO: 81.1 FL (ref 80–100)
MONOCYTES ABSOLUTE: 0.4 K/UL (ref 0–1.3)
MONOCYTES RELATIVE PERCENT: 8.1 %
NEUTROPHILS ABSOLUTE: 2.9 K/UL (ref 1.7–7.7)
NEUTROPHILS RELATIVE PERCENT: 60.3 %
PDW BLD-RTO: 19.2 % (ref 12.4–15.4)
PLATELET # BLD: 314 K/UL (ref 135–450)
PMV BLD AUTO: 6.9 FL (ref 5–10.5)
RBC # BLD: 4.4 M/UL (ref 4–5.2)
TOTAL IRON BINDING CAPACITY: 469 UG/DL (ref 260–445)
TSH SERPL DL<=0.05 MIU/L-ACNC: 2.33 UIU/ML (ref 0.27–4.2)
WBC # BLD: 4.8 K/UL (ref 4–11)

## 2020-07-08 RX ORDER — VENLAFAXINE HYDROCHLORIDE 150 MG/1
CAPSULE, EXTENDED RELEASE ORAL
Qty: 90 CAPSULE | Refills: 0 | Status: SHIPPED | OUTPATIENT
Start: 2020-07-08 | End: 2020-10-09 | Stop reason: SDUPTHER

## 2020-07-27 ENCOUNTER — NURSE TRIAGE (OUTPATIENT)
Dept: OTHER | Facility: CLINIC | Age: 45
End: 2020-07-27

## 2020-07-27 RX ORDER — LANOLIN ALCOHOL/MO/W.PET/CERES
CREAM (GRAM) TOPICAL
Qty: 90 TABLET | Refills: 2 | OUTPATIENT
Start: 2020-07-27

## 2020-07-27 NOTE — TELEPHONE ENCOUNTER
----- Message from Vanessa Alvarez sent at 7/27/2020  3:05 PM EDT -----  Subject: Message to Provider    QUESTIONS  Information for Provider? Patient is looking for an update regarding her   iron pills. Patient states she is out and is feeling side effects   including palpitations and SOB. Transferred to Nurse triage. Please   contact patient regarding refill.  ---------------------------------------------------------------------------  --------------  CALL BACK INFO  What is the best way for the office to contact you? OK to leave message on   voicemail  Preferred Call Back Phone Number? [phone]  ---------------------------------------------------------------------------  --------------  SCRIPT ANSWERS  Relationship to Patient?  Self

## 2020-07-27 NOTE — TELEPHONE ENCOUNTER
Patient called [facility]) to schedule appointment with red flag complaint; transferred to Nurse Access for triage by Chandan Lerner. Patient disconnected prior to transfer. This nurse attempted to call patient back with no answer. Please do not respond to the triage nurse through this encounter. Any subsequent communication should be directly with the patient. Reason for Disposition   No answer. First attempt to contact caller. Follow-up call scheduled within 15 minutes.     Protocols used: NO CONTACT OR DUPLICATE CONTACT CALL-ADULT-[place]

## 2020-08-28 ENCOUNTER — TELEPHONE (OUTPATIENT)
Dept: INTERNAL MEDICINE CLINIC | Age: 45
End: 2020-08-28

## 2020-08-28 NOTE — TELEPHONE ENCOUNTER
This is a controlled substance and I don't see that it was intended to be a long term medication. I recommend she have an appointment to address her anxiety.

## 2020-10-09 ENCOUNTER — TELEPHONE (OUTPATIENT)
Dept: INTERNAL MEDICINE CLINIC | Age: 45
End: 2020-10-09

## 2020-10-09 RX ORDER — VENLAFAXINE HYDROCHLORIDE 150 MG/1
CAPSULE, EXTENDED RELEASE ORAL
Qty: 90 CAPSULE | Refills: 0 | Status: SHIPPED | OUTPATIENT
Start: 2020-10-09

## 2021-01-12 ENCOUNTER — TELEPHONE (OUTPATIENT)
Dept: INTERNAL MEDICINE CLINIC | Age: 46
End: 2021-01-12

## 2021-01-12 NOTE — TELEPHONE ENCOUNTER
She has not had an appt for this for quite some time. Is she willing to set up an appt to discuss an appropriate approach to weaning this medication?

## 2021-01-12 NOTE — TELEPHONE ENCOUNTER
----- Message from Nigel Trevino sent at 1/12/2021  1:15 PM EST -----  Subject: Refill Request    QUESTIONS  Name of Medication? venlafaxine (EFFEXOR XR) 150 MG extended release   capsule  Patient-reported dosage and instructions? 150 mg once a day  How many days do you have left? 7  Preferred Pharmacy? [address] [place] [address]  Pharmacy phone number (if available)? [phone]  Additional Information for Provider? Wants to decrease the dose as she   changed her lifestyle and is starting to feel better and says that she   wants to ween off of it.  ---------------------------------------------------------------------------  --------------  CALL BACK INFO  What is the best way for the office to contact you? OK to leave message on   voicemail  Preferred Call Back Phone Number?  [phone]

## 2021-01-18 ENCOUNTER — VIRTUAL VISIT (OUTPATIENT)
Dept: INTERNAL MEDICINE CLINIC | Age: 46
End: 2021-01-18
Payer: COMMERCIAL

## 2021-01-18 DIAGNOSIS — F41.9 ANXIETY: Primary | ICD-10-CM

## 2021-01-18 PROCEDURE — 99213 OFFICE O/P EST LOW 20 MIN: CPT | Performed by: INTERNAL MEDICINE

## 2021-01-18 RX ORDER — VENLAFAXINE HYDROCHLORIDE 37.5 MG/1
CAPSULE, EXTENDED RELEASE ORAL
Qty: 21 CAPSULE | Refills: 0 | Status: SHIPPED | OUTPATIENT
Start: 2021-01-18

## 2021-01-18 RX ORDER — VENLAFAXINE HYDROCHLORIDE 75 MG/1
75 CAPSULE, EXTENDED RELEASE ORAL DAILY
Qty: 14 CAPSULE | Refills: 0 | Status: SHIPPED | OUTPATIENT
Start: 2021-01-18

## 2021-01-18 NOTE — PROGRESS NOTES
CC: anxiety    HPI:  Presenting for management of anxiety. She has been on Effexor for years. She reports that her anxiety has improved and has not been present for the past year. She attributes this to  from her . 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 She is interested in coming off of Effexor because she feels she no longer needs this. A/P   Diagnosis Orders   1. Anxiety     She has chronic anxiety which has improved significantly over the past year due to changes to her living situation. She would like to come off of Effexor and this is appropriate based on her improved symptoms. We discussed that a slow taper of Effexor is likely to make it easier for her to come off of this medication. She will take 75 mg daily for 2 weeks, then take 37.5 mg daily for 2 weeks, then take 37.5 mg mg every other day for 2 weeks. If she has any issues she is tapering off of the medication she will contact me. Payton Syed is a 54 y.o. female being evaluated by a Virtual Visit (video visit) encounter to address concerns as mentioned above. A caregiver was present when appropriate. Due to this being a TeleHealth encounter (During SJFJ-05 public health emergency), evaluation of the following organ systems was limited: Vitals/Constitutional/EENT/Resp/CV/GI//MS/Neuro/Skin/Heme-Lymph-Imm. Pursuant to the emergency declaration under the [address] [place] [address] and the [employer] Act, this Virtual Visit was conducted with patient's (and/or legal guardian's) consent, to reduce the patient's risk of exposure to COVID-19 and provide necessary medical care. The patient (and/or legal guardian) has also been advised to contact this office for worsening conditions or problems, and seek emergency medical treatment and/or call 911 if deemed necessary. Patient identification was verified at the start of the visit: Yes    Total time spent for this encounter: Not billed by time    Services were provided through a video synchronous discussion virtually to substitute for in-person clinic visit. Patient and provider were located at their individual homes. --Brit Fitch MD on 1/18/2021 at 11:44 AM    An electronic signature was used to authenticate this note.

## 2021-10-01 ENCOUNTER — APPOINTMENT (OUTPATIENT)
Dept: GENERAL RADIOLOGY | Age: 46
End: 2021-10-01
Payer: MEDICAID

## 2021-10-01 ENCOUNTER — HOSPITAL ENCOUNTER (EMERGENCY)
Age: 46
Discharge: HOME | End: 2021-10-01
Attending: EMERGENCY MEDICINE
Payer: MEDICAID

## 2021-10-01 VITALS
OXYGEN SATURATION: 96 % | TEMPERATURE: 99.2 F | HEART RATE: 109 BPM | BODY MASS INDEX: 25.99 KG/M2 | RESPIRATION RATE: 20 BRPM | DIASTOLIC BLOOD PRESSURE: 94 MMHG | HEIGHT: 65 IN | WEIGHT: 156 LBS | SYSTOLIC BLOOD PRESSURE: 142 MMHG

## 2021-10-01 DIAGNOSIS — U07.1 COVID-19: Primary | ICD-10-CM

## 2021-10-01 PROCEDURE — U0003 INFECTIOUS AGENT DETECTION BY NUCLEIC ACID (DNA OR RNA); SEVERE ACUTE RESPIRATORY SYNDROME CORONAVIRUS 2 (SARS-COV-2) (CORONAVIRUS DISEASE [COVID-19]), AMPLIFIED PROBE TECHNIQUE, MAKING USE OF HIGH THROUGHPUT TECHNOLOGIES AS DESCRIBED BY CMS-2020-01-R: HCPCS

## 2021-10-01 PROCEDURE — 71045 X-RAY EXAM CHEST 1 VIEW: CPT

## 2021-10-01 PROCEDURE — 99282 EMERGENCY DEPT VISIT SF MDM: CPT

## 2021-10-01 PROCEDURE — U0005 INFEC AGEN DETEC AMPLI PROBE: HCPCS

## 2021-10-01 RX ORDER — M-VIT,TX,IRON,MINS/CALC/FOLIC 27MG-0.4MG
1 TABLET ORAL DAILY
COMMUNITY

## 2021-10-01 ASSESSMENT — PAIN SCALES - GENERAL: PAINLEVEL_OUTOF10: 8

## 2021-10-01 ASSESSMENT — PAIN DESCRIPTION - LOCATION: LOCATION: CHEST

## 2021-10-01 NOTE — ED PROVIDER NOTES
[facility]        Pt Name: Danyelle Hernandez  MRN: [id number]  [place] 1975  Date of evaluation: 10/1/2021  Provider: Christie Moon MD  PCP: Jalyn Schwartz MD    This patient was seen and evaluated by the attending physician Christie Moon MD.      [address] [place] [address]       Chief Complaint   Patient presents with    Concern For COVID-19     Fiance tested positive one week ago for Zhangnate. Pt reports productive cough and weakness for one week     Cough       HISTORY OF PRESENT ILLNESS   (Location/Symptom, Timing/Onset, Context/Setting, Quality, Duration, Modifying Factors, Severity)  Note limiting factors. Danyelle Hernandez is a 51 y.o. female with known COVID-19 x1wk here for CC of fatigue, weakness, cough  Sx x 1wk  No n/v/d  + fever, chills, sweats, myalgias  No hemoptysis     Nursing Notes were all reviewed and agreed with or any disagreements were addressed  in the HPI. REVIEW OF SYSTEMS    (2-9 systems for level 4, 10 or more for level 5)     Review of Systems    Positives and Pertinent negatives as per HPI. Except as noted abovein the ROS, all other systems were reviewed and negative.        PAST MEDICAL HISTORY     Past Medical History:   Diagnosis Date    Anxiety     Anxiety     Depression     Depression     GERD (gastroesophageal reflux disease)     IBS (irritable bowel syndrome)     Ulcer disease          SURGICAL HISTORY     Past Surgical History:   Procedure Laterality Date    ADENOIDECTOMY      CHOLECYSTECTOMY, LAPAROSCOPIC  2/22/12    COLONOSCOPY N/A 5/21/2020    COLONOSCOPY POLYPECTOMY SNARE/COLD BIOPSY performed by Johnson Mcdonnell MD at [facility] N/A 6/16/2020    HYSTEROSCOPY DILATATION AND CURETTAGE WITH EMMANUEL ENDOMETRIAL ABLATION performed by Lay Gerard MD at [facility] ENDOSCOPY 1/28/11    UPPER GASTROINTESTINAL ENDOSCOPY  3-23-11    UPPER GASTROINTESTINAL ENDOSCOPY N/A 5/21/2020    EGD BIOPSY performed by Raleigh Fields MD at [address]       Discharge Medication List as of 10/1/2021 12:29 PM      CONTINUE these medications which have NOT CHANGED    Details   Multiple Vitamins-Minerals (THERAPEUTIC MULTIVITAMIN-MINERALS) tablet Take 1 tablet by mouth dailyHistorical Med      Pseudoephedrine-APAP-DM (DAYQUIL PO) Take by mouthHistorical Med      Pseudoeph-Doxylamine-DM-APAP (NYQUIL PO) Take by mouthHistorical Med      ELDERBERRY PO Take by mouthHistorical Med      !! venlafaxine (EFFEXOR XR) 75 MG extended release capsule Take 1 capsule by mouth daily For 14 days, Disp-14 capsule, R-0Normal      !! venlafaxine (EFFEXOR XR) 37.5 MG extended release capsule Take once daily for two weeks, than take one capsule every other day for two weeks. Begin this dose after completing two weeks of 75mg daily, Disp-21 capsule, R-0Normal      !! venlafaxine (EFFEXOR XR) 150 MG extended release capsule TAKE 1 CAPSULE BY MOUTH ONE TIME A DAY, Disp-90 capsule,R-0Normal      aspirin-acetaminophen-caffeine (EXCEDRIN MIGRAINE) 250-250-65 MG per tablet Take 1 tablet by mouth every 6 hours as needed for HeadachesHistorical Med      acetaminophen (TYLENOL) 325 MG tablet Take 325 mg by mouth every 6 hours as needed. !! - Potential duplicate medications found. Please discuss with provider.             ALLERGIES     Latex, Doxycycline, Bentyl [dicyclomine hcl], Dicyclomine, Dilaudid [hydromorphone hcl], Escitalopram, Escitalopram oxalate, Hydromorphone, and Prozac [fluoxetine hcl]    FAMILYHISTORY       Family History   Problem Relation Age of Onset    Stroke Father     Heart Disease Father     Asthma Mother     Depression Mother     Colon Cancer Paternal Aunt           SOCIAL HISTORY       Social History     Socioeconomic History    Marital status:      Spouse name: None    Number of children: None    Years of education: None    Highest education level: None   Occupational History    None   Tobacco Use    Smoking status: Never Smoker    Smokeless tobacco: Never Used   Vaping Use    Vaping Use: Never used   Substance and Sexual Activity    Alcohol use: Yes     Comment: SOCIAL    Drug use: No    Sexual activity: Yes     Partners: Male     Comment:    Other Topics Concern    None   Social History Narrative    None     Social Determinants of Health     Financial Resource Strain:     Difficulty of Paying Living Expenses:    Food Insecurity:     Worried About Running Out of Food in the Last Year:     [address] in the Last Year:    Transportation Needs:     Lack of Transportation (Medical):  Lack of Transportation (Non-Medical):    Physical Activity:     Days of Exercise per Week:     Minutes of Exercise per Session:    Stress:     Feeling of Stress :    Social Connections:     Frequency of Communication with Friends and Family:     Frequency of Social Gatherings with Friends and Family:     Attends Jainism Services:     Active Member of Clubs or Organizations:     Attends Club or Organization Meetings:     Marital Status:    Intimate Partner Violence:     Fear of Current or Ex-Partner:     Emotionally Abused:     Physically Abused:     Sexually Abused:        SCREENINGS             PHYSICAL EXAM    (up to 7 for level 4, 8 or more for level 5)     ED Triage Vitals   BP Temp Temp src Pulse Resp SpO2 Height Weight   -- -- -- -- -- -- -- --       Physical Exam     General Appearance:  Alert, cooperative, no distress, appears stated age. Head:  Normocephalic, without obvious abnormality, atraumatic. Eyes:  conjunctiva/corneas clear, EOM's intact. Sclera anicteric. ENT: Mucous membranes moist.   Neck: Supple, symmetrical, trachea midline, no adenopathy. No jugular venous distention. Lungs:   No Respiratory Distress.  Coarse diffusely   Chest Wall:  Atraumatic   Heart:  Rapid @ 110, regular   Abdomen:   Soft, NT, ND   Extremities:  Full range of motion. Pulses: Symmetric x4   Skin:  No rashes or lesions to exposed skin. Neurologic: Alert and oriented X 3. Motor grossly normal.  Speech clear. DIAGNOSTIC RESULTS   LABS:    Labs Reviewed   [id number]       All other labs were within normal range or not returned as of this dictation. EKG: All EKG's are interpreted by the Emergency Department Physician in the absence of a cardiologist.  Please see their note for interpretation of EKG. RADIOLOGY:   Non-plain film images such as CT, Ultrasound and MRI are read by the radiologist. Plain radiographic images are visualized andpreliminarily interpreted by the  ED Provider with the below findings:        Interpretation perthe Radiologist below, if available at the time of this note:    XR CHEST PORTABLE   Final Result   No acute cardiopulmonary process           No results found. PROCEDURES   Unless otherwise noted below, none     Procedures    CRITICAL CARE TIME   N/A    CONSULTS:  None      EMERGENCY DEPARTMENT COURSE and DIFFERENTIAL DIAGNOSIS/MDM:   Vitals:    Vitals:    10/01/21 1059   BP: (!) 142/94   Pulse: 109   Resp: 20   Temp: 99.2 °F (37.3 °C)   TempSrc: Oral   SpO2: 96%   Weight: 156 lb (70.8 kg)   Height: 5' 5\" (1.651 m)       Patient was given thefollowing medications:  Medications - No data to display    46F, Cough, known COVID  CXR to eval for multifocal infiltrate. X-ray is benign. Clinically have low suspicion for PE given normal saturation. At this juncture she is safe to go home. Return for worsening.       FINAL IMPRESSION      1. COVID-19          DISPOSITION/PLAN   DISPOSITION Decision To Discharge 10/01/2021 12:03:54 PM      PATIENT REFERREDTO:  Diana Richey MD  [address] [place] [address]          Diana Richey, [address] [facility] [address]  [phone]    Schedule an appointment as soon as possible for a visit in 3 days  Follow up within 3 days, Return to ED sooner if symptoms worsen      DISCHARGE MEDICATIONS:  Discharge Medication List as of 10/1/2021 12:29 PM          DISCONTINUED MEDICATIONS:  Discharge Medication List as of 10/1/2021 12:29 PM                 (Please note that portions ofthis note were completed with a voice recognition program.  Efforts were made to edit the dictations but occasionally words are mis-transcribed.)    Ministerio Diane MD (electronically signed)           Ministerio Diane MD  10/01/21 1030 Henefer MD Bela  10/01/21 8023

## 2021-10-02 LAB — SARS-COV-2: DETECTED

## 2021-10-04 ENCOUNTER — CARE COORDINATION (OUTPATIENT)
Dept: CARE COORDINATION | Age: 46
End: 2021-10-04

## 2021-10-04 NOTE — CARE COORDINATION
First attempt to reach the pt by the RN, DARA. The RN, Ambulatory Care Manager called and left a message with the nurse's call back number asking the pt to return the nurse's call.

## 2021-10-05 ENCOUNTER — CARE COORDINATION (OUTPATIENT)
Dept: CARE COORDINATION | Age: 46
End: 2021-10-05

## 2021-10-05 NOTE — CARE COORDINATION
Patient contacted regarding COVID-19 diagnosis. Discussed COVID-19 related testing which was available at this time. Test results were positive. Patient informed of results, if available? Yes. Ambulatory Care Manager contacted the patient by telephone to perform post discharge assessment. Call within 2 business days of discharge: Yes. Verified name and  with patient as identifiers. Provided introduction to self, and explanation of the CTN/ACM role, and reason for call due to risk factors for infection and/or exposure to COVID-19. Symptoms reviewed with patient who verbalized the following symptoms: fatigue, cough, nausea, diarrhea, chest pain, fast heart rate, dizziness/lightheadedness, no new symptoms, no worsening symptoms and headache. Instructed stay hydrated, take Tylenol or Ibuprofen for fever or body aches, rest and try to eat at least small meals. Due to no new or worsening symptoms encounter was not routed to provider for escalation. Discussed follow-up appointments. If no appointment was previously scheduled, appointment scheduling offered: Pt will follow up Dr. Jose Merino. Non-face-to-face services provided:  Obtained and reviewed discharge summary and/or continuity of care documents     Advance Care Planning:   Does patient have an Advance Directive:  decision maker updated. Educated patient about risk for severe COVID-19 due to risk factors according to CDC guidelines. ACM reviewed discharge instructions, medical action plan and red flag symptoms with the patient who verbalized understanding. Discussed COVID vaccination status: Yes. Education provided on COVID-19 vaccination as appropriate. Discussed exposure protocols and quarantine with CDC Guidelines. Patient was given an opportunity to verbalize any questions and concerns and agrees to contact ACM or health care provider for questions related to their healthcare.     Reviewed and educated patient on any new and changed medications related to discharge diagnosis     Was patient discharged with a pulse oximeter? Has her own. Discussed and confirmed pulse oximeter discharge instructions and when to notify provider or seek emergency care. SpO2 was ranging 91-95%. ACM provided contact information. Pt declined further calls. The pt stated she is feeling much better today and has been able to eat and is taking fluids.

## (undated) DEVICE — SET IRRIG L94IN ID0.281IN W/ 4.5IN DST FLX CONN 2 LD ON OFF

## (undated) DEVICE — BW-412T DISP COMBO CLEANING BRUSH: Brand: SINGLE USE COMBINATION CLEANING BRUSH

## (undated) DEVICE — PROCEDURE KIT ENDOSCP CUST

## (undated) DEVICE — HANDPIECE ABLAT DISP FOR ENDOMET SYS

## (undated) DEVICE — TRAP SPEC RETRV CLR PLAS POLYP IN LN SUCT QUIK CTCH

## (undated) DEVICE — SOLUTION IV IRRIG WATER 500ML POUR BRL ST 2F7113

## (undated) DEVICE — FORCEPS BX L240CM WRK CHN 2.8MM STD CAP W/ NDL MIC MESH

## (undated) DEVICE — Z INACTIVE USE 2660664 SOLUTION IRRIG 3000ML 0.9% SOD CHL USP UROMATIC PLAS CONT

## (undated) DEVICE — SET VLV 3 PC AWS DISPOSABLE GRDIAN SCOPEVALET

## (undated) DEVICE — Device

## (undated) DEVICE — MOUTHPIECE ENDOSCP L CTRL OPN AND SIDE PORTS DISP

## (undated) DEVICE — MASC TURNOVER KIT: Brand: MEDLINE INDUSTRIES, INC.

## (undated) DEVICE — SNARE ENDOSCP L240CM SHTH DIA24MM LOOP W10MM POLYP RND REINF

## (undated) DEVICE — GLOVE SURG SZ 65 THK91MIL LTX FREE SYN POLYISOPRENE

## (undated) DEVICE — FAIRFIELD D&C: Brand: MEDLINE INDUSTRIES, INC.